# Patient Record
Sex: FEMALE | Race: BLACK OR AFRICAN AMERICAN | NOT HISPANIC OR LATINO | URBAN - METROPOLITAN AREA
[De-identification: names, ages, dates, MRNs, and addresses within clinical notes are randomized per-mention and may not be internally consistent; named-entity substitution may affect disease eponyms.]

---

## 2017-10-09 ENCOUNTER — EMERGENCY (EMERGENCY)
Facility: HOSPITAL | Age: 22
LOS: 1 days | Discharge: HOME | End: 2017-10-09
Admitting: EMERGENCY MEDICINE
Payer: COMMERCIAL

## 2017-10-09 VITALS
SYSTOLIC BLOOD PRESSURE: 105 MMHG | HEART RATE: 94 BPM | OXYGEN SATURATION: 100 % | DIASTOLIC BLOOD PRESSURE: 68 MMHG | RESPIRATION RATE: 16 BRPM | TEMPERATURE: 99 F

## 2017-10-09 LAB
APPEARANCE UR: CLEAR — SIGNIFICANT CHANGE UP
BACTERIA # UR AUTO: SIGNIFICANT CHANGE UP
BASOPHILS # BLD AUTO: 0.03 K/UL — SIGNIFICANT CHANGE UP (ref 0–0.2)
BASOPHILS NFR BLD AUTO: 0.5 % — SIGNIFICANT CHANGE UP (ref 0–2)
BILIRUB UR-MCNC: NEGATIVE — SIGNIFICANT CHANGE UP
BLOOD UR QL VISUAL: HIGH
COLOR SPEC: YELLOW — SIGNIFICANT CHANGE UP
EOSINOPHIL # BLD AUTO: 0.16 K/UL — SIGNIFICANT CHANGE UP (ref 0–0.5)
EOSINOPHIL NFR BLD AUTO: 2.4 % — SIGNIFICANT CHANGE UP (ref 0–6)
GLUCOSE UR-MCNC: NEGATIVE — SIGNIFICANT CHANGE UP
HCT VFR BLD CALC: 28.7 % — LOW (ref 34.5–45)
HGB BLD-MCNC: 9.9 G/DL — LOW (ref 11.5–15.5)
IMM GRANULOCYTES # BLD AUTO: 0.01 # — SIGNIFICANT CHANGE UP
IMM GRANULOCYTES NFR BLD AUTO: 0.2 % — SIGNIFICANT CHANGE UP (ref 0–1.5)
KETONES UR-MCNC: NEGATIVE — SIGNIFICANT CHANGE UP
LEUKOCYTE ESTERASE UR-ACNC: NEGATIVE — SIGNIFICANT CHANGE UP
LYMPHOCYTES # BLD AUTO: 2.85 K/UL — SIGNIFICANT CHANGE UP (ref 1–3.3)
LYMPHOCYTES # BLD AUTO: 43.1 % — SIGNIFICANT CHANGE UP (ref 13–44)
MCHC RBC-ENTMCNC: 31.7 PG — SIGNIFICANT CHANGE UP (ref 27–34)
MCHC RBC-ENTMCNC: 34.5 % — SIGNIFICANT CHANGE UP (ref 32–36)
MCV RBC AUTO: 92 FL — SIGNIFICANT CHANGE UP (ref 80–100)
MONOCYTES # BLD AUTO: 0.4 K/UL — SIGNIFICANT CHANGE UP (ref 0–0.9)
MONOCYTES NFR BLD AUTO: 6.1 % — SIGNIFICANT CHANGE UP (ref 2–14)
MUCOUS THREADS # UR AUTO: SIGNIFICANT CHANGE UP
NEUTROPHILS # BLD AUTO: 3.16 K/UL — SIGNIFICANT CHANGE UP (ref 1.8–7.4)
NEUTROPHILS NFR BLD AUTO: 47.7 % — SIGNIFICANT CHANGE UP (ref 43–77)
NITRITE UR-MCNC: NEGATIVE — SIGNIFICANT CHANGE UP
NRBC # FLD: 0 — SIGNIFICANT CHANGE UP
PH UR: 8.5 — HIGH (ref 4.6–8)
PLATELET # BLD AUTO: 188 K/UL — SIGNIFICANT CHANGE UP (ref 150–400)
PMV BLD: 10.5 FL — SIGNIFICANT CHANGE UP (ref 7–13)
PROT UR-MCNC: 20 — SIGNIFICANT CHANGE UP
RBC # BLD: 3.12 M/UL — LOW (ref 3.8–5.2)
RBC # FLD: 11.8 % — SIGNIFICANT CHANGE UP (ref 10.3–14.5)
RBC CASTS # UR COMP ASSIST: >50 — HIGH (ref 0–?)
SP GR SPEC: 1.01 — SIGNIFICANT CHANGE UP (ref 1–1.03)
SQUAMOUS # UR AUTO: SIGNIFICANT CHANGE UP
UROBILINOGEN FLD QL: NORMAL E.U. — SIGNIFICANT CHANGE UP (ref 0.1–0.2)
WBC # BLD: 6.61 K/UL — SIGNIFICANT CHANGE UP (ref 3.8–10.5)
WBC # FLD AUTO: 6.61 K/UL — SIGNIFICANT CHANGE UP (ref 3.8–10.5)
WBC UR QL: SIGNIFICANT CHANGE UP (ref 0–?)

## 2017-10-09 PROCEDURE — 99284 EMERGENCY DEPT VISIT MOD MDM: CPT

## 2017-10-09 RX ORDER — IBUPROFEN 200 MG
600 TABLET ORAL ONCE
Qty: 0 | Refills: 0 | Status: DISCONTINUED | OUTPATIENT
Start: 2017-10-09 | End: 2017-10-09

## 2017-10-09 RX ORDER — KETOROLAC TROMETHAMINE 30 MG/ML
30 SYRINGE (ML) INJECTION ONCE
Qty: 0 | Refills: 0 | Status: DISCONTINUED | OUTPATIENT
Start: 2017-10-09 | End: 2017-10-09

## 2017-10-09 RX ADMIN — Medication 30 MILLIGRAM(S): at 23:47

## 2017-10-09 NOTE — ED PROVIDER NOTE - PLAN OF CARE
Follow up with your OBGYN in 1 week for further evaluation. Take Ibuprofen 600mg every 6 hours as needed for pain. Return to the Emergency Department for any new, worsening or concerning symptoms.

## 2017-10-09 NOTE — ED PROVIDER NOTE - CARE PLAN
Instructions for follow-up, activity and diet:	Follow up with your OBGYN in 1 week for further evaluation. Take Ibuprofen 600mg every 6 hours as needed for pain. Return to the Emergency Department for any new, worsening or concerning symptoms. Principal Discharge DX:	Dysmenorrhea  Instructions for follow-up, activity and diet:	Follow up with your OBGYN in 1 week for further evaluation. Take Ibuprofen 600mg every 6 hours as needed for pain. Return to the Emergency Department for any new, worsening or concerning symptoms.

## 2017-10-09 NOTE — ED PROVIDER NOTE - OBJECTIVE STATEMENT
22 year old female with no pertinent PMHx pw heavy vaginal bleeding for 3 days. Pt states that she has only had spotting after her IUD removal in October 2016. Pt states she is saturating 3-4 pads per day and today it has been lighter compared to the last few days. States that while she was waiting to com into the room she began to have abdominal cramping. Denies n/v/f/c, CP, SOB, dysuria, hematuria, melena, hematochezia, hx of stds. 22 year old female with no pertinent PMHx pw heavy vaginal bleeding for 3 days. Pt states that she has only had monthly spotting after her IUD removal in October 2016. Pt states she is saturating 3-4 pads per day and today it has been lighter compared to the last few days. States that while she was waiting to com into the room she began to have abdominal cramping. Denies n/v/f/c, CP, SOB, dysuria, hematuria, melena, hematochezia, hx of stds.

## 2017-10-09 NOTE — ED PROVIDER NOTE - PROGRESS NOTE DETAILS
PARVIN Oscar: pt NAD, VSS, pt hemodynamically stable - H/H is similar to previous values. Pt feeling better with pain medications. PE: abdomen-soft/nontender/nondistended. Advised to follow up with OBGYN.

## 2017-10-09 NOTE — ED PROVIDER NOTE - NONTENDER LOCATION
right upper quadrant/right lower quadrant/left lower quadrant/periumbilical/umbilical/suprapubic/left costovertebral angle/left upper quadrant/right costovertebral angle

## 2017-10-09 NOTE — ED ADULT TRIAGE NOTE - CHIEF COMPLAINT QUOTE
pt reports "heavy menstrual cycle since friday." pt states the need to double up pads and soaking through them. reports ha, "sleepiness," abdominal and lower back pain. removed IUD October 2016. this is first period in about 6 months. pt appears comfortable. denies medical hx

## 2017-10-11 LAB
BACTERIA UR CULT: SIGNIFICANT CHANGE UP
SPECIMEN SOURCE: SIGNIFICANT CHANGE UP

## 2019-01-22 ENCOUNTER — EMERGENCY (EMERGENCY)
Facility: HOSPITAL | Age: 24
LOS: 1 days | Discharge: HOME | End: 2019-01-22
Attending: EMERGENCY MEDICINE | Admitting: EMERGENCY MEDICINE
Payer: OTHER MISCELLANEOUS

## 2019-01-22 VITALS
RESPIRATION RATE: 16 BRPM | DIASTOLIC BLOOD PRESSURE: 94 MMHG | HEART RATE: 72 BPM | SYSTOLIC BLOOD PRESSURE: 142 MMHG | OXYGEN SATURATION: 100 % | TEMPERATURE: 98 F

## 2019-01-22 PROCEDURE — 99282 EMERGENCY DEPT VISIT SF MDM: CPT

## 2019-01-22 RX ORDER — IBUPROFEN 200 MG
600 TABLET ORAL ONCE
Qty: 0 | Refills: 0 | Status: COMPLETED | OUTPATIENT
Start: 2019-01-22 | End: 2019-01-22

## 2019-01-22 NOTE — ED PROVIDER NOTE - NSFOLLOWUPINSTRUCTIONS_ED_ALL_ED_FT
1. Rest and Elevate your arm  2. Use hot or cold packs as needed  3. 600mg motrin every 6 hours for pain  4. Increase physical activity as tolerated

## 2019-01-22 NOTE — ED PROVIDER NOTE - OBJECTIVE STATEMENT
24yo F denies PMHx p/w CC RUE pain after injury at work, explains she was getting ice from the machine when the door fell off and hit her forearm/hand, states she is now having some shoulder and upper back pain, no fever chills cp sob n/v/d numbness/tingling/weakness.

## 2019-01-22 NOTE — ED PROVIDER NOTE - ATTENDING CONTRIBUTION TO CARE
Dr. Caldera: 24 yo female with no sig PMH in ED with pain to right UE s/p door on ice machine at work hitting arm.  No other injuries or complaints.  No numbness, tingling or focal weakness.  On exam pt well appearing, in NAD, heart RRR, lungs CTAB, abd NTND, extremities without swelling, strength 5/5 in all extremities and skin without rash.

## 2019-01-22 NOTE — ED PROVIDER NOTE - MEDICAL DECISION MAKING DETAILS
well appearing 22yo F p/w CC RUE pain after work injury, pain c/w musculoskeletal origin, will treat with motrin (does not take pills, requesting liquid) and DC home with instructions to [facility].

## 2019-01-22 NOTE — ED ADULT TRIAGE NOTE - CHIEF COMPLAINT QUOTE
Pt. c/o pain to right shoulder radiating into upper arm. States she had part of freezer fall on her arm at work. Denies tingling/numbness.

## 2019-01-25 ENCOUNTER — EMERGENCY (EMERGENCY)
Facility: HOSPITAL | Age: 24
LOS: 1 days | Discharge: HOME | End: 2019-01-25
Attending: EMERGENCY MEDICINE | Admitting: EMERGENCY MEDICINE
Payer: OTHER MISCELLANEOUS

## 2019-01-25 VITALS
TEMPERATURE: 98 F | SYSTOLIC BLOOD PRESSURE: 117 MMHG | HEART RATE: 70 BPM | RESPIRATION RATE: 17 BRPM | DIASTOLIC BLOOD PRESSURE: 82 MMHG | OXYGEN SATURATION: 100 %

## 2019-01-25 PROCEDURE — 71046 X-RAY EXAM CHEST 2 VIEWS: CPT | Mod: 26

## 2019-01-25 PROCEDURE — 73030 X-RAY EXAM OF SHOULDER: CPT | Mod: 26,RT

## 2019-01-25 PROCEDURE — 99284 EMERGENCY DEPT VISIT MOD MDM: CPT

## 2019-01-25 RX ORDER — KETOROLAC TROMETHAMINE 30 MG/ML
30 SYRINGE (ML) INJECTION ONCE
Qty: 0 | Refills: 0 | Status: DISCONTINUED | OUTPATIENT
Start: 2019-01-25 | End: 2019-01-25

## 2019-01-25 RX ORDER — ACETAMINOPHEN 500 MG
650 TABLET ORAL ONCE
Qty: 0 | Refills: 0 | Status: COMPLETED | OUTPATIENT
Start: 2019-01-25 | End: 2019-01-25

## 2019-01-25 RX ADMIN — Medication 30 MILLIGRAM(S): at 14:41

## 2019-01-25 NOTE — ED PROVIDER NOTE - NSFOLLOWUPINSTRUCTIONS_ED_ALL_ED_FT
Take motrin 200 mg every 6-8 hours as needed for pain and/or tylenol 325 mg every 4 hours as needed for pain.  If worse pain, swelling, weakness, numbness, or any worse symptoms return to the ER.  Follow up with your doctor within the next few days and orthopedics.

## 2019-01-25 NOTE — ED PROVIDER NOTE - PHYSICAL EXAMINATION
well appearing,   Head is AT,  rt arm: no ttp of forearm,  neck: no midline tenderness,  rt trapezius pain but no deformity

## 2019-01-25 NOTE — ED ADULT TRIAGE NOTE - CHIEF COMPLAINT QUOTE
pt seen on Tuesday and diagnosed with a pulled muscle, given Motrin and heat packs, states pain persists. +ROM noted.

## 2019-01-25 NOTE — ED PROVIDER NOTE - OBJECTIVE STATEMENT
24 YO F presents to ED with c/o rt arm pain x3 days. Pt states while at work reached into freezer to get ice, when the door to close freezer dropped on her arm, because door was broken. Pt states she came to [facility] the day event happened, and was told to take pain reliever (Motrin) and heat packs. Stopped taken Motrin because she states it only made her sleepy. Continues to use icy patch. Pt notes arm is still in pain and swollen. Did not receive an XR at that time. Pt states movement aggravates the arm. Pt works at [employer]. Pt doesn't smoke or drink ETOH. Pt has irregular periods. No further complaints. 24 YO F presents to ED with c/o rt arm pain x3 days. Pt states while at work reached into freezer to get ice, when the door to close freezer dropped on her arm, because door was broken. Pt states she came to [facility] the day event happened, and was told to take pain reliever (Motrin) and heat packs. Stopped taken Motrin because she states it only made her sleepy. Continues to use icy patch. Pt notes arm is still in pain and swollen. Did not receive an XR at that time. Pt states movement aggravates the arm. 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 Pt doesn't smoke or drink ETOH. Pt has irregular periods. No further complaints.

## 2019-12-05 ENCOUNTER — EMERGENCY (EMERGENCY)
Facility: HOSPITAL | Age: 24
LOS: 1 days | Discharge: HOME | End: 2019-12-05
Admitting: EMERGENCY MEDICINE
Payer: COMMERCIAL

## 2019-12-05 VITALS
TEMPERATURE: 98 F | SYSTOLIC BLOOD PRESSURE: 111 MMHG | RESPIRATION RATE: 18 BRPM | DIASTOLIC BLOOD PRESSURE: 71 MMHG | HEART RATE: 74 BPM | OXYGEN SATURATION: 100 %

## 2019-12-05 LAB
ALBUMIN SERPL ELPH-MCNC: 4.5 G/DL — SIGNIFICANT CHANGE UP (ref 3.3–5)
ALP SERPL-CCNC: 84 U/L — SIGNIFICANT CHANGE UP (ref 40–120)
ALT FLD-CCNC: 10 U/L — SIGNIFICANT CHANGE UP (ref 4–33)
ANION GAP SERPL CALC-SCNC: 14 MMO/L — SIGNIFICANT CHANGE UP (ref 7–14)
APPEARANCE UR: SIGNIFICANT CHANGE UP
AST SERPL-CCNC: 13 U/L — SIGNIFICANT CHANGE UP (ref 4–32)
BACTERIA # UR AUTO: SIGNIFICANT CHANGE UP
BASOPHILS # BLD AUTO: 0.02 K/UL — SIGNIFICANT CHANGE UP (ref 0–0.2)
BASOPHILS NFR BLD AUTO: 0.2 % — SIGNIFICANT CHANGE UP (ref 0–2)
BILIRUB SERPL-MCNC: 0.4 MG/DL — SIGNIFICANT CHANGE UP (ref 0.2–1.2)
BILIRUB UR-MCNC: NEGATIVE — SIGNIFICANT CHANGE UP
BLOOD UR QL VISUAL: NEGATIVE — SIGNIFICANT CHANGE UP
BUN SERPL-MCNC: 9 MG/DL — SIGNIFICANT CHANGE UP (ref 7–23)
CALCIUM SERPL-MCNC: 9.9 MG/DL — SIGNIFICANT CHANGE UP (ref 8.4–10.5)
CHLORIDE SERPL-SCNC: 97 MMOL/L — LOW (ref 98–107)
CO2 SERPL-SCNC: 25 MMOL/L — SIGNIFICANT CHANGE UP (ref 22–31)
COLOR SPEC: SIGNIFICANT CHANGE UP
CREAT SERPL-MCNC: 0.64 MG/DL — SIGNIFICANT CHANGE UP (ref 0.5–1.3)
EOSINOPHIL # BLD AUTO: 0.03 K/UL — SIGNIFICANT CHANGE UP (ref 0–0.5)
EOSINOPHIL NFR BLD AUTO: 0.3 % — SIGNIFICANT CHANGE UP (ref 0–6)
GLUCOSE SERPL-MCNC: 84 MG/DL — SIGNIFICANT CHANGE UP (ref 70–99)
GLUCOSE UR-MCNC: NEGATIVE — SIGNIFICANT CHANGE UP
HCG SERPL-ACNC: SIGNIFICANT CHANGE UP MIU/ML
HCT VFR BLD CALC: 38.9 % — SIGNIFICANT CHANGE UP (ref 34.5–45)
HGB BLD-MCNC: 13 G/DL — SIGNIFICANT CHANGE UP (ref 11.5–15.5)
IMM GRANULOCYTES NFR BLD AUTO: 0.2 % — SIGNIFICANT CHANGE UP (ref 0–1.5)
KETONES UR-MCNC: >150 — HIGH
LEUKOCYTE ESTERASE UR-ACNC: SIGNIFICANT CHANGE UP
LYMPHOCYTES # BLD AUTO: 2 K/UL — SIGNIFICANT CHANGE UP (ref 1–3.3)
LYMPHOCYTES # BLD AUTO: 23 % — SIGNIFICANT CHANGE UP (ref 13–44)
MCHC RBC-ENTMCNC: 30.2 PG — SIGNIFICANT CHANGE UP (ref 27–34)
MCHC RBC-ENTMCNC: 33.4 % — SIGNIFICANT CHANGE UP (ref 32–36)
MCV RBC AUTO: 90.3 FL — SIGNIFICANT CHANGE UP (ref 80–100)
MONOCYTES # BLD AUTO: 0.48 K/UL — SIGNIFICANT CHANGE UP (ref 0–0.9)
MONOCYTES NFR BLD AUTO: 5.5 % — SIGNIFICANT CHANGE UP (ref 2–14)
NEUTROPHILS # BLD AUTO: 6.13 K/UL — SIGNIFICANT CHANGE UP (ref 1.8–7.4)
NEUTROPHILS NFR BLD AUTO: 70.8 % — SIGNIFICANT CHANGE UP (ref 43–77)
NITRITE UR-MCNC: NEGATIVE — SIGNIFICANT CHANGE UP
NRBC # FLD: 0 K/UL — SIGNIFICANT CHANGE UP (ref 0–0)
PH UR: 7 — SIGNIFICANT CHANGE UP (ref 5–8)
PLATELET # BLD AUTO: 234 K/UL — SIGNIFICANT CHANGE UP (ref 150–400)
PMV BLD: 10.7 FL — SIGNIFICANT CHANGE UP (ref 7–13)
POTASSIUM SERPL-MCNC: 3.7 MMOL/L — SIGNIFICANT CHANGE UP (ref 3.5–5.3)
POTASSIUM SERPL-SCNC: 3.7 MMOL/L — SIGNIFICANT CHANGE UP (ref 3.5–5.3)
PROT SERPL-MCNC: 8.2 G/DL — SIGNIFICANT CHANGE UP (ref 6–8.3)
PROT UR-MCNC: 70 — SIGNIFICANT CHANGE UP
RBC # BLD: 4.31 M/UL — SIGNIFICANT CHANGE UP (ref 3.8–5.2)
RBC # FLD: 13.2 % — SIGNIFICANT CHANGE UP (ref 10.3–14.5)
RBC CASTS # UR COMP ASSIST: SIGNIFICANT CHANGE UP (ref 0–?)
SODIUM SERPL-SCNC: 136 MMOL/L — SIGNIFICANT CHANGE UP (ref 135–145)
SP GR SPEC: 1.04 — SIGNIFICANT CHANGE UP (ref 1–1.04)
SQUAMOUS # UR AUTO: SIGNIFICANT CHANGE UP
UROBILINOGEN FLD QL: SIGNIFICANT CHANGE UP
WBC # BLD: 8.68 K/UL — SIGNIFICANT CHANGE UP (ref 3.8–10.5)
WBC # FLD AUTO: 8.68 K/UL — SIGNIFICANT CHANGE UP (ref 3.8–10.5)
WBC UR QL: HIGH (ref 0–?)

## 2019-12-05 PROCEDURE — 99284 EMERGENCY DEPT VISIT MOD MDM: CPT

## 2019-12-05 PROCEDURE — 76830 TRANSVAGINAL US NON-OB: CPT | Mod: 26

## 2019-12-05 RX ORDER — NITROFURANTOIN MACROCRYSTAL 50 MG
1 CAPSULE ORAL
Qty: 14 | Refills: 0
Start: 2019-12-05 | End: 2019-12-11

## 2019-12-05 RX ORDER — ONDANSETRON 8 MG/1
1 TABLET, FILM COATED ORAL
Qty: 12 | Refills: 0
Start: 2019-12-05

## 2019-12-05 RX ORDER — ONDANSETRON 8 MG/1
4 TABLET, FILM COATED ORAL ONCE
Refills: 0 | Status: COMPLETED | OUTPATIENT
Start: 2019-12-05 | End: 2019-12-05

## 2019-12-05 RX ORDER — SODIUM CHLORIDE 9 MG/ML
1000 INJECTION INTRAMUSCULAR; INTRAVENOUS; SUBCUTANEOUS ONCE
Refills: 0 | Status: COMPLETED | OUTPATIENT
Start: 2019-12-05 | End: 2019-12-05

## 2019-12-05 RX ADMIN — ONDANSETRON 4 MILLIGRAM(S): 8 TABLET, FILM COATED ORAL at 17:42

## 2019-12-05 RX ADMIN — SODIUM CHLORIDE 1000 MILLILITER(S): 9 INJECTION INTRAMUSCULAR; INTRAVENOUS; SUBCUTANEOUS at 17:42

## 2019-12-05 NOTE — ED ADULT NURSE NOTE - CHIEF COMPLAINT QUOTE
Pt with co lower abdominal pain radiating to the left. Pt reports nausea and vomiting since sunday. Pt is afebrile. no vomiting noted in triage . Pt was sent over from [facility] was diagnosed with UTI and Positive pregnancy test. Pt denies any dysuria.

## 2019-12-05 NOTE — ED PROVIDER NOTE - PATIENT PORTAL LINK FT
You can access the FollowMyHealth Patient Portal offered by [facility] by registering at the following website: [email or web address]. By joining [employer]’s FollowMyHealth portal, you will also be able to view your health information using other applications (apps) compatible with our system.

## 2019-12-05 NOTE — ED PROVIDER NOTE - OBJECTIVE STATEMENT
25 yo female  presents to ED c/o lower abdominal pain L>R, n/v x few days.  Pain radiates to the flank.  Pt states saw PCP today and was told she tested positive for pregnancy.  LMP 10/3/19.  Pt referred to ED for hydration and US r/o ectopic.  Pt denies any vaginal bleeding or discharge, fever, chills, urinary symptoms, cp, sob, diarrhea.

## 2019-12-05 NOTE — ED PROVIDER NOTE - NSFOLLOWUPINSTRUCTIONS_ED_ALL_ED_FT
Take zofran 4 mg every 4 hours as needed for nausea.  Take macrobid 100mg 2x/day for 7 days.  Take prenatal vitamins daily.  Drink plenty of fluids.  Follow up with your ob/gyn within 1-2 weeks.  Return to ED for any worsening symptoms.

## 2019-12-05 NOTE — ED ADULT TRIAGE NOTE - CHIEF COMPLAINT QUOTE
Pt with co lower abdominal pain radiating to the left. Pt reports nausea and vomiting since sunday. Pt is afebrile. no vomiting noted in triage . Pt was sent over from [facility] was diagnosed with UTI and Positive pregnancy test. Pt denies any dysuria.

## 2019-12-05 NOTE — ED ADULT NURSE NOTE - OBJECTIVE STATEMENT
pt complaining of lower abd pain and nausea, ucg positive , denies any shortness off breathing or vag bleeding ,iv started

## 2019-12-05 NOTE — ED PROVIDER NOTE - CLINICAL SUMMARY MEDICAL DECISION MAKING FREE TEXT BOX
25 yo female  presents to ED c/o lower abdominal pain L>R, n/v x few days.  +UCG, not confirmed with US; will check labs, hcg, US, UA, IV fluids and reassess

## 2019-12-07 LAB
BACTERIA UR CULT: SIGNIFICANT CHANGE UP
SPECIMEN SOURCE: SIGNIFICANT CHANGE UP

## 2019-12-20 ENCOUNTER — EMERGENCY (EMERGENCY)
Facility: HOSPITAL | Age: 24
LOS: 1 days | Discharge: HOME | End: 2019-12-20
Attending: STUDENT IN AN ORGANIZED HEALTH CARE EDUCATION/TRAINING PROGRAM | Admitting: EMERGENCY MEDICINE
Payer: COMMERCIAL

## 2019-12-20 VITALS
HEART RATE: 71 BPM | OXYGEN SATURATION: 100 % | TEMPERATURE: 99 F | DIASTOLIC BLOOD PRESSURE: 74 MMHG | SYSTOLIC BLOOD PRESSURE: 118 MMHG | RESPIRATION RATE: 16 BRPM

## 2019-12-20 LAB
ALBUMIN SERPL ELPH-MCNC: 4.5 G/DL — SIGNIFICANT CHANGE UP (ref 3.3–5)
ALP SERPL-CCNC: 82 U/L — SIGNIFICANT CHANGE UP (ref 40–120)
ALT FLD-CCNC: 47 U/L — HIGH (ref 4–33)
ANION GAP SERPL CALC-SCNC: 16 MMO/L — HIGH (ref 7–14)
APPEARANCE UR: SIGNIFICANT CHANGE UP
AST SERPL-CCNC: 34 U/L — HIGH (ref 4–32)
BACTERIA # UR AUTO: HIGH
BASOPHILS # BLD AUTO: 0.03 K/UL — SIGNIFICANT CHANGE UP (ref 0–0.2)
BASOPHILS NFR BLD AUTO: 0.3 % — SIGNIFICANT CHANGE UP (ref 0–2)
BILIRUB SERPL-MCNC: 0.5 MG/DL — SIGNIFICANT CHANGE UP (ref 0.2–1.2)
BILIRUB UR-MCNC: NEGATIVE — SIGNIFICANT CHANGE UP
BLOOD UR QL VISUAL: NEGATIVE — SIGNIFICANT CHANGE UP
BUN SERPL-MCNC: 12 MG/DL — SIGNIFICANT CHANGE UP (ref 7–23)
CALCIUM SERPL-MCNC: 10.3 MG/DL — SIGNIFICANT CHANGE UP (ref 8.4–10.5)
CHLORIDE SERPL-SCNC: 100 MMOL/L — SIGNIFICANT CHANGE UP (ref 98–107)
CO2 SERPL-SCNC: 25 MMOL/L — SIGNIFICANT CHANGE UP (ref 22–31)
COLOR SPEC: YELLOW — SIGNIFICANT CHANGE UP
CREAT SERPL-MCNC: 0.73 MG/DL — SIGNIFICANT CHANGE UP (ref 0.5–1.3)
EOSINOPHIL # BLD AUTO: 0.04 K/UL — SIGNIFICANT CHANGE UP (ref 0–0.5)
EOSINOPHIL NFR BLD AUTO: 0.4 % — SIGNIFICANT CHANGE UP (ref 0–6)
GLUCOSE SERPL-MCNC: 83 MG/DL — SIGNIFICANT CHANGE UP (ref 70–99)
GLUCOSE UR-MCNC: NEGATIVE — SIGNIFICANT CHANGE UP
HCG SERPL-ACNC: SIGNIFICANT CHANGE UP MIU/ML
HCT VFR BLD CALC: 39.8 % — SIGNIFICANT CHANGE UP (ref 34.5–45)
HGB BLD-MCNC: 13.5 G/DL — SIGNIFICANT CHANGE UP (ref 11.5–15.5)
HYALINE CASTS # UR AUTO: HIGH
IMM GRANULOCYTES NFR BLD AUTO: 0.2 % — SIGNIFICANT CHANGE UP (ref 0–1.5)
KETONES UR-MCNC: HIGH
LEUKOCYTE ESTERASE UR-ACNC: NEGATIVE — SIGNIFICANT CHANGE UP
LYMPHOCYTES # BLD AUTO: 2.31 K/UL — SIGNIFICANT CHANGE UP (ref 1–3.3)
LYMPHOCYTES # BLD AUTO: 23.3 % — SIGNIFICANT CHANGE UP (ref 13–44)
MCHC RBC-ENTMCNC: 30.5 PG — SIGNIFICANT CHANGE UP (ref 27–34)
MCHC RBC-ENTMCNC: 33.9 % — SIGNIFICANT CHANGE UP (ref 32–36)
MCV RBC AUTO: 90 FL — SIGNIFICANT CHANGE UP (ref 80–100)
MONOCYTES # BLD AUTO: 0.72 K/UL — SIGNIFICANT CHANGE UP (ref 0–0.9)
MONOCYTES NFR BLD AUTO: 7.3 % — SIGNIFICANT CHANGE UP (ref 2–14)
NEUTROPHILS # BLD AUTO: 6.81 K/UL — SIGNIFICANT CHANGE UP (ref 1.8–7.4)
NEUTROPHILS NFR BLD AUTO: 68.5 % — SIGNIFICANT CHANGE UP (ref 43–77)
NITRITE UR-MCNC: NEGATIVE — SIGNIFICANT CHANGE UP
NRBC # FLD: 0 K/UL — SIGNIFICANT CHANGE UP (ref 0–0)
PH UR: 6 — SIGNIFICANT CHANGE UP (ref 5–8)
PLATELET # BLD AUTO: 288 K/UL — SIGNIFICANT CHANGE UP (ref 150–400)
PMV BLD: 10.2 FL — SIGNIFICANT CHANGE UP (ref 7–13)
POTASSIUM SERPL-MCNC: 3.4 MMOL/L — LOW (ref 3.5–5.3)
POTASSIUM SERPL-SCNC: 3.4 MMOL/L — LOW (ref 3.5–5.3)
PROT SERPL-MCNC: 8.6 G/DL — HIGH (ref 6–8.3)
PROT UR-MCNC: 70 — SIGNIFICANT CHANGE UP
RBC # BLD: 4.42 M/UL — SIGNIFICANT CHANGE UP (ref 3.8–5.2)
RBC # FLD: 12.7 % — SIGNIFICANT CHANGE UP (ref 10.3–14.5)
RBC CASTS # UR COMP ASSIST: SIGNIFICANT CHANGE UP (ref 0–?)
SODIUM SERPL-SCNC: 141 MMOL/L — SIGNIFICANT CHANGE UP (ref 135–145)
SP GR SPEC: 1.04 — SIGNIFICANT CHANGE UP (ref 1–1.04)
SQUAMOUS # UR AUTO: SIGNIFICANT CHANGE UP
UROBILINOGEN FLD QL: HIGH
WBC # BLD: 9.93 K/UL — SIGNIFICANT CHANGE UP (ref 3.8–10.5)
WBC # FLD AUTO: 9.93 K/UL — SIGNIFICANT CHANGE UP (ref 3.8–10.5)
WBC UR QL: HIGH (ref 0–?)

## 2019-12-20 PROCEDURE — 76817 TRANSVAGINAL US OBSTETRIC: CPT | Mod: 26

## 2019-12-20 PROCEDURE — 99220: CPT

## 2019-12-20 RX ORDER — SODIUM CHLORIDE 9 MG/ML
2000 INJECTION INTRAMUSCULAR; INTRAVENOUS; SUBCUTANEOUS ONCE
Refills: 0 | Status: COMPLETED | OUTPATIENT
Start: 2019-12-20 | End: 2019-12-20

## 2019-12-20 RX ORDER — ONDANSETRON 8 MG/1
4 TABLET, FILM COATED ORAL ONCE
Refills: 0 | Status: COMPLETED | OUTPATIENT
Start: 2019-12-20 | End: 2019-12-20

## 2019-12-20 RX ORDER — SODIUM CHLORIDE 9 MG/ML
1000 INJECTION, SOLUTION INTRAVENOUS
Refills: 0 | Status: DISCONTINUED | OUTPATIENT
Start: 2019-12-20 | End: 2019-12-21

## 2019-12-20 RX ADMIN — ONDANSETRON 4 MILLIGRAM(S): 8 TABLET, FILM COATED ORAL at 20:34

## 2019-12-20 RX ADMIN — SODIUM CHLORIDE 120 MILLILITER(S): 9 INJECTION, SOLUTION INTRAVENOUS at 20:34

## 2019-12-20 RX ADMIN — SODIUM CHLORIDE 2000 MILLILITER(S): 9 INJECTION INTRAMUSCULAR; INTRAVENOUS; SUBCUTANEOUS at 22:51

## 2019-12-20 NOTE — ED PROVIDER NOTE - PROGRESS NOTE DETAILS
Darlene Mcrae M.D: US showing IUP and small subchor. discussed with OBGYN who are aware of pt and discussed with cdu who are accepting pt for supportive care.

## 2019-12-20 NOTE — ED PROVIDER NOTE - CLINICAL SUMMARY MEDICAL DECISION MAKING FREE TEXT BOX
23 yo F, , with 8 weeks of gestational age, irregular menstrual period, with no significant PMH presents to the ER c/o worsening nausea, vomiting, unable to tolerate oral intake x1 week. well appearing female p/w n/v a/w mild pelvic pain, no vaginal bleeding, unable to tolerate oral intake, likely hyperemesis; plan: give IVF, check labs, Ua, Hcg, TVUS to eval pregnancy, likely CDU for supportive care and antiemetic.

## 2019-12-20 NOTE — ED PROVIDER NOTE - ATTENDING CONTRIBUTION TO CARE
Darlene Mcrae M.D: 24F , currently 8 weeks pregnant, confirmed IUP on US, p/w intractable n/v since pregnancy began. has been following with OB (Dr Brock) and has been taking zofran and reglan without relief. states she called her ob today who stated pt may need PICC line and visiting nurse for further care. also notes intermittently with low back pain and abd crmaping but this has been constant for weeks and is unchanged. no f/c no urinary symptoms. on exam abd soft ntnd, heart rrr, lungs ctab, mucous membranes dry. likely hyperemesis persistent despite outpt treatment attempts. plan for labs, urine, fluids, antiemetics and cdu vs admission for further care. benign abd exam at this time no cocnern for appendicitis or other surgical emergencies.

## 2019-12-20 NOTE — ED ADULT TRIAGE NOTE - CHIEF COMPLAINT QUOTE
Pt apprx 8 weeks preg. c/o vomiting x multiple episodes this past week. Unable to keep food down. Has taken Zofran and Reglan PO without relief. Also having abd discomfort. Denies any other pmhx

## 2019-12-20 NOTE — ED PROVIDER NOTE - OBJECTIVE STATEMENT
25 yo F, , with 8 weeks of gestational age, irregular menstrual period, with no significant PMH presents to the ER c/o worsening nausea, vomiting, unable to tolerate oral intake x1 week. Pt reports nausea every time she smells food, vomiting intermittently right after eating, and spitting out fluids. States she took Zofran with mild improvement of nausea, but still can't keep food & fluids down. 23 yo F, , with 8 weeks of gestational age, irregular menstrual period, with no significant PMH presents to the ER c/o worsening nausea, vomiting, unable to tolerate oral intake x1 week. Pt reports nausea every time she smells food, vomiting intermittently right after eating, and spitting out fluids. States she took Zofran with mild improvement of nausea, but still can't keep food & fluids down. Pt admits having mid pelvic pain, mild, intermittent, since 20, no vaginal bleeding. Reports having some pain, like a poke in left abdomen from vomiting. Denies any fever, chills, dizziness, headache, chest pain, sob, dysuria, hematuria, trauma/injury or any other complaints.

## 2019-12-21 VITALS
DIASTOLIC BLOOD PRESSURE: 75 MMHG | OXYGEN SATURATION: 100 % | TEMPERATURE: 98 F | SYSTOLIC BLOOD PRESSURE: 112 MMHG | RESPIRATION RATE: 16 BRPM | HEART RATE: 78 BPM

## 2019-12-21 DIAGNOSIS — O21.0 MILD HYPEREMESIS GRAVIDARUM: ICD-10-CM

## 2019-12-21 LAB
ALBUMIN SERPL ELPH-MCNC: 2.9 G/DL — LOW (ref 3.3–5)
ALP SERPL-CCNC: 56 U/L — SIGNIFICANT CHANGE UP (ref 40–120)
ALT FLD-CCNC: 34 U/L — HIGH (ref 4–33)
ANION GAP SERPL CALC-SCNC: 12 MMO/L — SIGNIFICANT CHANGE UP (ref 7–14)
ANION GAP SERPL CALC-SCNC: 9 MMO/L — SIGNIFICANT CHANGE UP (ref 7–14)
APPEARANCE UR: CLEAR — SIGNIFICANT CHANGE UP
AST SERPL-CCNC: 25 U/L — SIGNIFICANT CHANGE UP (ref 4–32)
BACTERIA # UR AUTO: SIGNIFICANT CHANGE UP
BILIRUB SERPL-MCNC: 0.4 MG/DL — SIGNIFICANT CHANGE UP (ref 0.2–1.2)
BILIRUB UR-MCNC: NEGATIVE — SIGNIFICANT CHANGE UP
BLOOD UR QL VISUAL: NEGATIVE — SIGNIFICANT CHANGE UP
BUN SERPL-MCNC: 7 MG/DL — SIGNIFICANT CHANGE UP (ref 7–23)
BUN SERPL-MCNC: 9 MG/DL — SIGNIFICANT CHANGE UP (ref 7–23)
CALCIUM SERPL-MCNC: 8.2 MG/DL — LOW (ref 8.4–10.5)
CALCIUM SERPL-MCNC: 8.6 MG/DL — SIGNIFICANT CHANGE UP (ref 8.4–10.5)
CHLORIDE SERPL-SCNC: 106 MMOL/L — SIGNIFICANT CHANGE UP (ref 98–107)
CHLORIDE SERPL-SCNC: 108 MMOL/L — HIGH (ref 98–107)
CO2 SERPL-SCNC: 21 MMOL/L — LOW (ref 22–31)
CO2 SERPL-SCNC: 22 MMOL/L — SIGNIFICANT CHANGE UP (ref 22–31)
COLOR SPEC: YELLOW — SIGNIFICANT CHANGE UP
CREAT SERPL-MCNC: 0.62 MG/DL — SIGNIFICANT CHANGE UP (ref 0.5–1.3)
CREAT SERPL-MCNC: 0.67 MG/DL — SIGNIFICANT CHANGE UP (ref 0.5–1.3)
GLUCOSE SERPL-MCNC: 84 MG/DL — SIGNIFICANT CHANGE UP (ref 70–99)
GLUCOSE SERPL-MCNC: 88 MG/DL — SIGNIFICANT CHANGE UP (ref 70–99)
GLUCOSE UR-MCNC: NEGATIVE — SIGNIFICANT CHANGE UP
HBA1C BLD-MCNC: 5.2 % — SIGNIFICANT CHANGE UP (ref 4–5.6)
HYALINE CASTS # UR AUTO: HIGH
KETONES UR-MCNC: HIGH
LEUKOCYTE ESTERASE UR-ACNC: NEGATIVE — SIGNIFICANT CHANGE UP
MAGNESIUM SERPL-MCNC: 1.5 MG/DL — LOW (ref 1.6–2.6)
NITRITE UR-MCNC: NEGATIVE — SIGNIFICANT CHANGE UP
PH UR: 6 — SIGNIFICANT CHANGE UP (ref 5–8)
PHOSPHATE SERPL-MCNC: 2.5 MG/DL — SIGNIFICANT CHANGE UP (ref 2.5–4.5)
POTASSIUM SERPL-MCNC: 3.1 MMOL/L — LOW (ref 3.5–5.3)
POTASSIUM SERPL-MCNC: 4.2 MMOL/L — SIGNIFICANT CHANGE UP (ref 3.5–5.3)
POTASSIUM SERPL-SCNC: 3.1 MMOL/L — LOW (ref 3.5–5.3)
POTASSIUM SERPL-SCNC: 4.2 MMOL/L — SIGNIFICANT CHANGE UP (ref 3.5–5.3)
PROT SERPL-MCNC: 5.7 G/DL — LOW (ref 6–8.3)
PROT UR-MCNC: 50 — SIGNIFICANT CHANGE UP
RBC CASTS # UR COMP ASSIST: SIGNIFICANT CHANGE UP (ref 0–?)
SODIUM SERPL-SCNC: 137 MMOL/L — SIGNIFICANT CHANGE UP (ref 135–145)
SODIUM SERPL-SCNC: 141 MMOL/L — SIGNIFICANT CHANGE UP (ref 135–145)
SP GR SPEC: 1.04 — SIGNIFICANT CHANGE UP (ref 1–1.04)
SQUAMOUS # UR AUTO: SIGNIFICANT CHANGE UP
UROBILINOGEN FLD QL: HIGH
WBC UR QL: HIGH (ref 0–?)

## 2019-12-21 PROCEDURE — 99217: CPT

## 2019-12-21 RX ORDER — POTASSIUM CHLORIDE 20 MEQ
40 PACKET (EA) ORAL ONCE
Refills: 0 | Status: COMPLETED | OUTPATIENT
Start: 2019-12-21 | End: 2019-12-21

## 2019-12-21 RX ORDER — SODIUM CHLORIDE 9 MG/ML
1000 INJECTION, SOLUTION INTRAVENOUS
Refills: 0 | Status: DISCONTINUED | OUTPATIENT
Start: 2019-12-21 | End: 2019-12-30

## 2019-12-21 RX ORDER — LIDOCAINE 4 G/100G
1 CREAM TOPICAL ONCE
Refills: 0 | Status: COMPLETED | OUTPATIENT
Start: 2019-12-21 | End: 2019-12-21

## 2019-12-21 RX ORDER — METOCLOPRAMIDE HCL 10 MG
10 TABLET ORAL ONCE
Refills: 0 | Status: COMPLETED | OUTPATIENT
Start: 2019-12-21 | End: 2019-12-21

## 2019-12-21 RX ORDER — ONDANSETRON 8 MG/1
4 TABLET, FILM COATED ORAL ONCE
Refills: 0 | Status: COMPLETED | OUTPATIENT
Start: 2019-12-21 | End: 2019-12-21

## 2019-12-21 RX ORDER — ACETAMINOPHEN 500 MG
650 TABLET ORAL ONCE
Refills: 0 | Status: COMPLETED | OUTPATIENT
Start: 2019-12-21 | End: 2019-12-21

## 2019-12-21 RX ORDER — POTASSIUM CHLORIDE 20 MEQ
40 PACKET (EA) ORAL ONCE
Refills: 0 | Status: DISCONTINUED | OUTPATIENT
Start: 2019-12-21 | End: 2019-12-21

## 2019-12-21 RX ORDER — ONDANSETRON 8 MG/1
4 TABLET, FILM COATED ORAL EVERY 4 HOURS
Refills: 0 | Status: DISCONTINUED | OUTPATIENT
Start: 2019-12-21 | End: 2019-12-30

## 2019-12-21 RX ORDER — ONDANSETRON 8 MG/1
1 TABLET, FILM COATED ORAL
Qty: 15 | Refills: 0
Start: 2019-12-21

## 2019-12-21 RX ADMIN — Medication 650 MILLIGRAM(S): at 17:28

## 2019-12-21 RX ADMIN — Medication 650 MILLIGRAM(S): at 01:32

## 2019-12-21 RX ADMIN — ONDANSETRON 4 MILLIGRAM(S): 8 TABLET, FILM COATED ORAL at 20:46

## 2019-12-21 RX ADMIN — ONDANSETRON 4 MILLIGRAM(S): 8 TABLET, FILM COATED ORAL at 13:14

## 2019-12-21 RX ADMIN — ONDANSETRON 4 MILLIGRAM(S): 8 TABLET, FILM COATED ORAL at 19:10

## 2019-12-21 RX ADMIN — LIDOCAINE 1 PATCH: 4 CREAM TOPICAL at 14:31

## 2019-12-21 RX ADMIN — Medication 650 MILLIGRAM(S): at 10:41

## 2019-12-21 RX ADMIN — SODIUM CHLORIDE 150 MILLILITER(S): 9 INJECTION, SOLUTION INTRAVENOUS at 00:32

## 2019-12-21 RX ADMIN — Medication 40 MILLIEQUIVALENT(S): at 09:24

## 2019-12-21 RX ADMIN — Medication 650 MILLIGRAM(S): at 09:27

## 2019-12-21 RX ADMIN — SODIUM CHLORIDE 150 MILLILITER(S): 9 INJECTION, SOLUTION INTRAVENOUS at 20:45

## 2019-12-21 RX ADMIN — SODIUM CHLORIDE 150 MILLILITER(S): 9 INJECTION, SOLUTION INTRAVENOUS at 14:07

## 2019-12-21 RX ADMIN — ONDANSETRON 4 MILLIGRAM(S): 8 TABLET, FILM COATED ORAL at 09:04

## 2019-12-21 RX ADMIN — Medication 10 MILLIGRAM(S): at 20:02

## 2019-12-21 RX ADMIN — Medication 650 MILLIGRAM(S): at 00:32

## 2019-12-21 RX ADMIN — ONDANSETRON 4 MILLIGRAM(S): 8 TABLET, FILM COATED ORAL at 05:34

## 2019-12-21 NOTE — ED CDU PROVIDER INITIAL DAY NOTE - ATTENDING CONTRIBUTION TO CARE
Darlene Mcrae M.D: 24F , currently 8 weeks pregnant, confirmed IUP on US, p/w intractable n/v since pregnancy began. has been following with OB (Dr Brock) and has been taking zofran and reglan without relief. states she called her ob today who stated pt may need PICC line and visiting nurse for further care. also notes intermittently with low back pain and abd crmaping but this has been constant for weeks and is unchanged. no f/c no urinary symptoms. on exam abd soft ntnd, heart rrr, lungs ctab, mucous membranes dry. likely hyperemesis persistent despite outpt treatment attempts. plan for labs, urine, fluids, antiemetics and cdu vs admission for further care. benign abd exam at this time no cocnern for appendicitis or other surgical emergencies. Darlene Mcrae M.D: 24F , currently 8 weeks pregnant, confirmed IUP on US, p/w intractable n/v since pregnancy began. has been following with OB (Dr Brock) and has been taking zofran and reglan without relief. states she called her ob today who stated pt may need PICC line and visiting nurse for further care. also notes intermittently with low back pain and abd crmaping but this has been constant for weeks and is unchanged. no f/c no urinary symptoms. on exam abd soft ntnd, heart rrr, lungs ctab, mucous membranes dry. likely hyperemesis persistent despite outpt treatment attempts. plan for labs, urine, fluids, antiemetics and cdu for ivf and continued care. benign abd exam at this time no cocnern for appendicitis or other surgical emergencies.

## 2019-12-21 NOTE — ED CDU PROVIDER DISPOSITION NOTE - NSFOLLOWUPINSTRUCTIONS_ED_ALL_ED_FT
Follow up with your Ob/Gyn doctor on Monday 12/23/19.  Notify your primary medical doctor of your ER visit.  If you need to find a doctor to follow up with, you may call the [facility] Patient Access Services helpline at [phone] to find names/contact #s for a practitioner (or specialist) to follow up with.  Bring your discharge papers / test results with you to your follow up appointment(s).  Rest / no strenuous activity.  Stay well hydrated.  MEDICATIONS:  See attached medication sheet(s).  ***Return to the Emergency Department if you experience any new / worsening symptoms or have any problems / concerns.***

## 2019-12-21 NOTE — ED CDU PROVIDER SUBSEQUENT DAY NOTE - MEDICAL DECISION MAKING DETAILS
23 yo F, , with 8 weeks of gestational age, irregular menstrual period, with no significant PMH presents to the ER c/o worsening nausea, vomiting, unable to tolerate oral intake x1 week. Transferred to CDU for; IVF, am labs, antiemetics prn, vitals q4 and frequent re-evals.

## 2019-12-21 NOTE — CONSULT NOTE ADULT - SUBJECTIVE AND OBJECTIVE BOX
25 yo , LMP 10/4 with irregular cycles at 8+5 days gestation by TVUS admitted to CDU with worsening n/v, PO intolerance x1 week. Patient last admitted  for hyperemesis with IV hydration and improved nausea on Zofran. She was prescribed Zofran to take outpatient with some improvement, however her nausea/vomiting never completely resolved. She was admitted to CDU again for continued monitoring and IV hydration  and since reports improvement in nausea with Zofran, Reglan but reports return of symptoms as medications wear off. She had cereal in the morning and apple juice and a tuna sandwich in the afternoon. She was able to keep most of it down, but continues to have persistent nausea. She was last seen in Dr. Brock' office 12/10 and says she was prescribed Reglan with no relief. Plans in progress for home IV nurse, however not set up yet. Patient also reports some lower back pain and lower abdominal pain from persistent vomiting. Denies fevers, chills, dysuria, hematuria. Denies vaginal bleeding, LOF, cramping, CP, SOB, lightheadedness, dizziness.     Patient with +ketones in urine and hypokalemic on presentation s/p K repletion with correction.     OBHx - NSVDx 2 (, )   GYNhx - denies  PAST MEDICAL & SURGICAL HISTORY:  No pertinent past medical history  No significant past surgical history    HOME MEDICATIONS  Reglan     MEDICATIONS  (STANDING):  dextrose 5% + sodium chloride 0.45%. 1000 milliLiter(s) (150 mL/Hr) IV Continuous <Continuous>    MEDICATIONS  (PRN):  ondansetron Injectable 4 milliGRAM(s) IV Push every 4 hours PRN Nausea and/or Vomiting    Allergies: No Known Allergies    Physical Exam:   General: sitting comfortably in bed, NAD   Neck: supple, full ROM, no lymphadenopathy  CV: RRR  Lungs: CTA b/l, good air flow b/l   Back: No CVA tenderness bilaterally   Abd: Soft, NTND, no rebound or guarding   Pelvic: deferred   Ext: NT b/l, no edema      Vital Signs Last 24 Hrs  T(C): 36.7 (21 Dec 2019 19:36), Max: 36.9 (21 Dec 2019 09:56)  T(F): 98 (21 Dec 2019 19:36), Max: 98.4 (21 Dec 2019 09:56)  HR: 88 (21 Dec 2019 19:36) (62 - 88)  BP: 130/90 (21 Dec 2019 19:36) (91/54 - 130/90)  BP(mean): --  RR: 16 (21 Dec 2019 19:36) (15 - 17)  SpO2: 100% (21 Dec 2019 19:36) (96% - 100%)                          13.5   9.93  )-----------( 288      ( 20 Dec 2019 20:30 )             39.8           137  |  106  |  7   ----------------------------<  84  4.2   |  22  |  0.67    Ca    8.6      21 Dec 2019 13:05  Phos  2.5       Mg     1.5         TPro  5.7<L>  /  Alb  2.9<L>  /  TBili  0.4  /  DBili  x   /  AST  25  /  ALT  34<H>  /  AlkPhos  56        CAPILLARY BLOOD GLUCOSE      POCT Blood Glucose.: 84 mg/dL (21 Dec 2019 00:16)  POCT Blood Glucose.: 62 mg/dL (20 Dec 2019 23:43)      LIVER FUNCTIONS - ( 21 Dec 2019 05:25 )  Alb: 2.9 g/dL / Pro: 5.7 g/dL / ALK PHOS: 56 u/L / ALT: 34 u/L / AST: 25 u/L / GGT: x             Urinalysis Basic - ( 21 Dec 2019 01:00 )    Color: YELLOW / Appearance: CLEAR / S.036 / pH: 6.0  Gluc: NEGATIVE / Ketone: LARGE  / Bili: NEGATIVE / Urobili: MODERATE   Blood: NEGATIVE / Protein: 50 / Nitrite: NEGATIVE   Leuk Esterase: NEGATIVE / RBC: 3-5 / WBC 6-10   Sq Epi: FEW / Non Sq Epi: x / Bacteria: FEW        EXAM:  US OB TRANSVAGINAL        PROCEDURE DATE:  Dec 20 2019         INTERPRETATION:  CLINICAL INFORMATION: 8 weeks pregnant. Left pelvic pain for 2 days. Nausea and vomiting. Irregular masses.    LMP: 10/4/2019    Estimated Gestational Age by LMP: 11 weeks    COMPARISON: Pelvic ultrasound 2019    Endovaginal pelvic sonogram only. Color and Spectral Doppler was performed.    FINDINGS:    Uterus: Intrauterine pregnancy. Subchorionic hematoma measures 2.3 x 0.9 x 1.6 cm.    Gestational Sac Size (mean): 3.5 cm    Gestations sac shape : Normal.    Crown Rump Length: 2 cm     Estimated Gestational Age: 8 weeks and 4 days    Yolk Sac: Normal.    Fetal Heart Rate: 163 bpm    Right ovary: 4.5 x 1.5 x 1.5 cm. corpus luteum on 0.9 x 1.2 x 1.5 cm Normal arterial and venous waveforms.    Left ovary: 3.5 x 1.9 x 2.5 cm. Within normal limits. Normal arterial and venous waveforms.    Fluid: None.    IMPRESSION:    Single viable intrauterine pregnancy.  Estimated gestational age of 8 weeks and 4 days.  Estimated due date of  2020    Small subchorionic hematoma.

## 2019-12-21 NOTE — CONSULT NOTE ADULT - PROBLEM SELECTOR RECOMMENDATION 9
- Continue IV hydration, recommend banana bag    - Continue Zofran, Reglan, Phenergan PRN inpatient   - Recommend Rx for Zofran 8mg q6hrs x15 tabs and Phenergan suppository BID x6   - IV nurse plans initiated on Friday 12/20 and in progress per Dr. Brock. Patient to call Dr. Brock' office on Monday 12/23 to confirm finalized plans.     Flynn Jenkins PGY2  d/w Dr. Brock - Continue IV hydration, recommend banana bag    - Continue Zofran, Reglan, Phenergan PRN inpatient   - Recommend Rx for Zofran 8mg q6hrs x15 tabs and Phenergan suppository BID x6   - PO challenge in AM   - IV nurse plans initiated on Friday 12/20 and in progress per Dr. Brock. Patient to call Dr. Brock' office on Monday 12/23 to confirm finalized plans.     Flynn Jenkins PGY2  d/w Dr. Brock

## 2019-12-21 NOTE — ED CDU PROVIDER SUBSEQUENT DAY NOTE - HISTORY
Refer to Initial CDU note- "25 yo F, , with 8 weeks of gestational age, irregular menstrual period, with no significant PMH presents to the ER c/o worsening nausea, vomiting, unable to tolerate oral intake x1 week. Pt reports nausea every time she smells food, vomiting intermittently right after eating, and spitting out fluids. States she took Zofran with mild improvement of nausea, but still can't keep food & fluids down. Pt admits having mid pelvic pain, mild, intermittent, since 20, no vaginal bleeding. Reports having some pain, like a poke in left abdomen from vomiting ". Denied any fever, chills, dizziness, headache, chest pain, sob, dysuria, hematuria vaginal bleeding, trauma/injury or any other complaints. Pt seen and worked up in ED, labs wnl besides some mild hypokalemia and ketones in urine. Transferred to CDU for; IVF, am labs, antiemetics prn, vitals q4 and frequent re-evals.     In interim, patient still notes some nausea and back pain. States able to tolerate minimal PO. Will continue with antiemetic prn, pain control, IVF and monitor closely and plan to PO challenge. No acute events overnight.

## 2019-12-21 NOTE — ED CDU PROVIDER DISPOSITION NOTE - CLINICAL COURSE
24F 8 weeks preg p/w N/V and was rx diclegis and zofran. Has dx of hyperemesis gravidarum, has seen own OB Dr Brock for it and told if sx do not improve there's a program for home infusion.   Was able to yokasta PO last night and this AM.  Adv diet, if yokasta PO can go home,  small subchor seen needs f/u.  No VB.  Near 6 o'clock or so still having symptoms, doesn't feel ready to go home, OB eval consideration of admission/VNS initiation/hydration/?PICC line.  If feeling better can d/c home f/u OB as outpt.

## 2019-12-21 NOTE — CONSULT NOTE ADULT - ASSESSMENT
23 yo , LMP 10/4 at 8+5 days by TVUS admitted to CDU  with persistent nausea/vomiting, PO intolerance x1 week. Patient with improved PO intolerance since yesterday on Reglan, Zofran. TVUS with single viable IUP at 8+5 days. Hypokalemia to 3.1 s/p repletion. Repeat BMP wnl.

## 2019-12-21 NOTE — ED CDU PROVIDER SUBSEQUENT DAY NOTE - PROGRESS NOTE DETAILS
GYN consulted, regarding persistent n/v. Spoke to resident on call, states will come evaluate patient.

## 2019-12-21 NOTE — ED CDU PROVIDER SUBSEQUENT DAY NOTE - PHYSICAL EXAMINATION
Vital signs reviewed.   CONSTITUTIONAL: Well-appearing; well-nourished; in no apparent distress. Non-toxic appearing.   HEAD: Normocephalic, atraumatic.  EYES: PERRL, EOM intact, conjunctiva and sclera WNL.  ENT: normal nose; no rhinorrhea;  NECK: Trachea midline   RESP: NAD  ABD/GI: soft, non-distended; non-tender  EXT/MS: moves all extremities  SKIN: Normal for age and race  NEURO: Awake, alert, oriented x 3, no gross deficits  PSYCH: Normal mood; appropriate affect.

## 2019-12-21 NOTE — ED CDU PROVIDER INITIAL DAY NOTE - INDICATION FOR OBSERVATION
IV hydration, nausea control, supportive care, general observation care / monitoring./Therapeutic Intensity/Other

## 2019-12-21 NOTE — ED CDU PROVIDER DISPOSITION NOTE - PATIENT PORTAL LINK FT
You can access the FollowMyHealth Patient Portal offered by [facility] by registering at the following website: [email or web address]. By joining [employer]’s FollowMyHealth portal, you will also be able to view your health information using other applications (apps) compatible with our system.

## 2019-12-21 NOTE — ED CDU PROVIDER SUBSEQUENT DAY NOTE - ATTENDING CONTRIBUTION TO CARE
24F 8 weeks preg p/w N/V and was rx diclegis and zofran. Has dx of hyperemesis gravidarum, has seen own OB Dr Brock for it and told if sx do not improve there's a program for home infusion.   Was able to yokasta PO last night and this AM.  Adv diet, if yokasta PO can go home,  small subchor seen needs f/u.  No VB.  Near 6 o'clock or so still having symptoms, doesn't feel ready to go home, OB eval consideration of admission/VNS initiation/hydration/?PICC line.  If feeling better can d/c home f/u OB as outpt.  VS:  unremarkable    GEN - malaise, spitting; A+O x3   HEAD - NC/AT     ENT - PEERL, EOMI, mucous membranes dry, no discharge      NECK: Neck supple, non-tender without lymphadenopathy, no masses, no JVD  PULM - CTA b/l,  symmetric breath sounds  COR -  normal heart sounds    ABD - , gravid uterus, NT, soft,  BACK - no CVA tenderness, nontender spine     EXTREMS - no edema, no deformity, warm and well perfused    SKIN - no rash or bruising      NEUROLOGIC - alert, CN 2-12 intact, sensation nl, motor no focal deficit.

## 2019-12-21 NOTE — ED CDU PROVIDER INITIAL DAY NOTE - PROGRESS NOTE DETAILS
24F 8 weeks preg p/w N/V and was rx diclegis and zofran.  Was able to yokasta PO last night and this AM.  Adv diet, if yokasta PO can go home,  small subchor seen needs f/u.  No VB.  Pt still feeling unwell, nauseous, spitting, back cramps, headache.  Pt able to eat here incl tuna fish; pt reports her OB said to her they might consider PICC line and VNS; will call OB for eval.  Dispo per OB.  If OB clears pt and she wants to go home later I have no objection.

## 2019-12-22 LAB
BACTERIA UR CULT: SIGNIFICANT CHANGE UP
BACTERIA UR CULT: SIGNIFICANT CHANGE UP
SPECIMEN SOURCE: SIGNIFICANT CHANGE UP
SPECIMEN SOURCE: SIGNIFICANT CHANGE UP

## 2020-01-07 NOTE — ED CDU PROVIDER INITIAL DAY NOTE - OBJECTIVE STATEMENT
25 yo F, , with 8 weeks of gestational age, irregular menstrual period, with no significant PMH presents to the ER c/o worsening nausea, vomiting, unable to tolerate oral intake x1 week. Pt reports nausea every time she smells food, vomiting intermittently right after eating, and spitting out fluids. States she took Zofran with mild improvement of nausea, but still can't keep food & fluids down. Pt admits having mid pelvic pain, mild, intermittent, since 19, no vaginal bleeding. Reports having some pain, like a poke in left abdomen from vomiting. Denies any fever, chills, dizziness, headache, chest pain, sob, dysuria, hematuria, trauma/injury or any other complaints.    CDU PARVIN Sanders Note------  25 yo female, , pregnant at ~8 weeks gestational age, presented to the ED for nausea, vomiting, anorexia x 1 week, as per above.  Pt was evaluated in the ED, TVUS showed SLIUP at 8w 4d and small subchorionic hematoma.  Pt given IV hydration, ondansetron; pt dispo'd to CDU for continued care plan:  IV hydration, nausea control, supportive care, general observation care / monitoring.  On CDU arrival, pt was c/o chills; pt was orally afebrile, found to have fingerstick glucose in 60's; given juice and jello, which was tolerated well with no vomiting and improvement in fingerstick glucose to 82.  No other c/o.  CDU care plan was d/w pt who verbalizes agreement with plan. Normal rate, regular rhythm.  Heart sounds S1, S2.  No murmurs, rubs or gallops.

## 2020-05-26 ENCOUNTER — OUTPATIENT (OUTPATIENT)
Dept: INPATIENT UNIT | Facility: HOSPITAL | Age: 25
LOS: 1 days | Discharge: HOME | End: 2020-05-26
Payer: COMMERCIAL

## 2020-05-26 VITALS — HEART RATE: 82 BPM | SYSTOLIC BLOOD PRESSURE: 105 MMHG | DIASTOLIC BLOOD PRESSURE: 70 MMHG

## 2020-05-26 VITALS — TEMPERATURE: 98 F

## 2020-05-26 DIAGNOSIS — O26.899 OTHER SPECIFIED PREGNANCY RELATED CONDITIONS, UNSPECIFIED TRIMESTER: ICD-10-CM

## 2020-05-26 DIAGNOSIS — Z3A.00 WEEKS OF GESTATION OF PREGNANCY NOT SPECIFIED: ICD-10-CM

## 2020-05-26 LAB
APPEARANCE UR: CLEAR — SIGNIFICANT CHANGE UP
BILIRUB UR-MCNC: NEGATIVE — SIGNIFICANT CHANGE UP
BLOOD UR QL VISUAL: NEGATIVE — SIGNIFICANT CHANGE UP
COLOR SPEC: SIGNIFICANT CHANGE UP
FLECAINIDE SERPL-MCNC: NEGATIVE — SIGNIFICANT CHANGE UP
GLUCOSE UR-MCNC: NEGATIVE — SIGNIFICANT CHANGE UP
KETONES UR-MCNC: SIGNIFICANT CHANGE UP
LEUKOCYTE ESTERASE UR-ACNC: NEGATIVE — SIGNIFICANT CHANGE UP
NITRITE UR-MCNC: NEGATIVE — SIGNIFICANT CHANGE UP
PH UR: 7 — SIGNIFICANT CHANGE UP (ref 5–8)
PROT UR-MCNC: 10 — SIGNIFICANT CHANGE UP
SP GR SPEC: 1.01 — SIGNIFICANT CHANGE UP (ref 1–1.04)
UROBILINOGEN FLD QL: NORMAL — SIGNIFICANT CHANGE UP

## 2020-05-26 PROCEDURE — 76818 FETAL BIOPHYS PROFILE W/NST: CPT | Mod: 26

## 2020-05-26 PROCEDURE — 99204 OFFICE O/P NEW MOD 45 MIN: CPT | Mod: 25

## 2020-05-26 PROCEDURE — 76830 TRANSVAGINAL US NON-OB: CPT | Mod: 26

## 2020-05-26 RX ADMIN — Medication 12 MILLIGRAM(S): at 20:28

## 2020-05-26 NOTE — OB PROVIDER TRIAGE NOTE - NSOBPROVIDERNOTE_OBGYN_ALL_OB_FT
24 y.o.  Black patient ROB 2020 @ 30.6 weeks presents with c/o lower abdominal cramping 7/10 pain, pt unsure of interval. Pt denies LOF, VB. States +FM and uncomplicated antepartum course.    allergies:  NKDA  medications:  prenatal vitamins  ferrous sulfate    med/surg hx:  denies  OBGYN hx:  2014    6lbs F  2016  7lbs    abdomen soft, nontender  sse: cervix appears closed, moderate leukorrhea  taus: sliup, cephalic presentation, anterior placenta, bpp 8/8, carolina 15.6, efw 1520 grams  tvus: cervical length 3.31, no dynamic changes/ funneling noted  nst in progress    UA pending  PO hydration initiated    Juany NP 24 y.o.  Black patient ROB 2020 @ 30.6 weeks presents with c/o lower abdominal cramping 7/10 pain, pt unsure of interval. Pt denies LOF, VB. States +FM and uncomplicated antepartum course.    allergies:  NKDA  medications:  prenatal vitamins  ferrous sulfate    med/surg hx:  denies  OBGYN hx:  2014    6lbs F  2016  7lbs    abdomen soft, nontender  sse: cervix appears closed, moderate leukorrhea  taus: sliup, cephalic presentation, anterior placenta, bpp 8/8, carolina 15.6, efw 1520 grams  tvus: cervical length 3.31, no dynamic changes/ funneling noted  nst in progress    UA pending  PO hydration initiated      Discussed with Dr De Jesus. Plan for betamethasone dose # 1. FFN sent.    Juany, NP 24 y.o.  Black patient ROB 2020 @ 30.6 weeks presents with c/o lower abdominal cramping 7/10 pain, pt unsure of interval. Pt denies LOF, VB. States +FM and uncomplicated antepartum course.    allergies:  NKDA  medications:  prenatal vitamins  ferrous sulfate    med/surg hx:  denies  OBGYN hx:  2014    6lbs F  2016  7lbs    abdomen soft, nontender  sse: cervix appears closed, moderate leukorrhea  taus: sliup, cephalic presentation, anterior placenta, bpp 8/8, carolina 15.6, efw 1520 grams  tvus: cervical length 3.31, no dynamic changes/ funneling noted  nst in progress    UA pending  PO hydration initiated      Discussed with Dr De Jesus. Plan for betamethasone dose # 1. FFN sent.    ISAURA Harrington      FFN Negative  UA WNL   1st dose of betamethasone given    Discussed with Dr. De Jesus.   -Patient cleared for discharge home  -Pt to return tomorrow 2020 for second dose of betamethasone  -PTL precautions reviewed  -Kick counts reviewed  -Verbal and written discharge instructions, patient verbalized understanding

## 2020-05-26 NOTE — OB RN TRIAGE NOTE - PMH
No pertinent past medical history    Vaginal delivery  14 FT  (F) 6#  Vaginal delivery  2016 FT  (M) 7#

## 2020-05-26 NOTE — OB PROVIDER TRIAGE NOTE - ADDITIONAL INSTRUCTIONS
-Patient cleared for discharge home  -Pt to return tomorrow 5/27/2020 for second dose of betamethasone  -PTL precautions reviewed  -Kick counts reviewed  -Verbal and written discharge instructions, patient verbalized understanding

## 2020-05-26 NOTE — OB PROVIDER TRIAGE NOTE - NSHPPHYSICALEXAM_GEN_ALL_CORE
abdomen soft, nontender  sse: cervix appears closed, moderate leukorrhea  taus: sliup, cephalic presentation, anterior placenta, bpp 8/8, carolina 15.6, efw 1520 grams  tvus: cervical length 3.31, no dynamic changes funneling  nst in progress

## 2020-05-26 NOTE — OB PROVIDER TRIAGE NOTE - HISTORY OF PRESENT ILLNESS
24 y.o.  Black patient ROB 7/29/2020 @ 30.6 weeks presents with c/o lower abdominal cramping 7/10 pain, pt unsure of interval. Pt denies LOF, VB. States +FM and uncomplicated antepartum course.

## 2020-05-27 ENCOUNTER — OUTPATIENT (OUTPATIENT)
Dept: INPATIENT UNIT | Facility: HOSPITAL | Age: 25
LOS: 1 days | Discharge: HOME | End: 2020-05-27

## 2020-05-27 DIAGNOSIS — O26.899 OTHER SPECIFIED PREGNANCY RELATED CONDITIONS, UNSPECIFIED TRIMESTER: ICD-10-CM

## 2020-05-27 DIAGNOSIS — Z3A.00 WEEKS OF GESTATION OF PREGNANCY NOT SPECIFIED: ICD-10-CM

## 2020-05-27 RX ADMIN — Medication 12 MILLIGRAM(S): at 20:38

## 2020-06-22 ENCOUNTER — OUTPATIENT (OUTPATIENT)
Dept: OUTPATIENT SERVICES | Facility: HOSPITAL | Age: 25
LOS: 1 days | Discharge: HOME | End: 2020-06-22
Payer: COMMERCIAL

## 2020-06-22 VITALS
DIASTOLIC BLOOD PRESSURE: 50 MMHG | HEART RATE: 87 BPM | RESPIRATION RATE: 16 BRPM | SYSTOLIC BLOOD PRESSURE: 87 MMHG | TEMPERATURE: 99 F

## 2020-06-22 DIAGNOSIS — Z3A.00 WEEKS OF GESTATION OF PREGNANCY NOT SPECIFIED: ICD-10-CM

## 2020-06-22 DIAGNOSIS — O26.899 OTHER SPECIFIED PREGNANCY RELATED CONDITIONS, UNSPECIFIED TRIMESTER: ICD-10-CM

## 2020-06-22 LAB
APPEARANCE UR: CLEAR — SIGNIFICANT CHANGE UP
BILIRUB UR-MCNC: NEGATIVE — SIGNIFICANT CHANGE UP
BLOOD UR QL VISUAL: NEGATIVE — SIGNIFICANT CHANGE UP
COLOR SPEC: COLORLESS — SIGNIFICANT CHANGE UP
GLUCOSE UR-MCNC: NEGATIVE — SIGNIFICANT CHANGE UP
KETONES UR-MCNC: NEGATIVE — SIGNIFICANT CHANGE UP
LEUKOCYTE ESTERASE UR-ACNC: NEGATIVE — SIGNIFICANT CHANGE UP
NITRITE UR-MCNC: NEGATIVE — SIGNIFICANT CHANGE UP
PH UR: 7 — SIGNIFICANT CHANGE UP (ref 5–8)
PROT UR-MCNC: NEGATIVE — SIGNIFICANT CHANGE UP
SP GR SPEC: 1.01 — SIGNIFICANT CHANGE UP (ref 1–1.04)
UROBILINOGEN FLD QL: NORMAL — SIGNIFICANT CHANGE UP

## 2020-06-22 PROCEDURE — 99214 OFFICE O/P EST MOD 30 MIN: CPT

## 2020-06-22 PROCEDURE — 76818 FETAL BIOPHYS PROFILE W/NST: CPT | Mod: 26

## 2020-06-22 RX ORDER — BENZOYL PEROXIDE MICRONIZED 5.8 %
1 TOWELETTE (EA) TOPICAL
Qty: 0 | Refills: 0 | DISCHARGE

## 2020-06-22 NOTE — OB PROVIDER TRIAGE NOTE - NSOBPROVIDERNOTE_OBGYN_ALL_OB_FT
No evidence of labor at this time     d/w Dr. Dubon     -Cleared for discharge  -Keep scheduled appt   -Fetal kick count reviewed   -Warning signs reviewed

## 2020-06-22 NOTE — OB PROVIDER TRIAGE NOTE - NSHPPHYSICALEXAM_GEN_ALL_CORE
Vital Signs Last 24 Hrs  T(C): 37 (22 Jun 2020 22:40), Max: 37 (22 Jun 2020 22:40)  T(F): 98.6 (22 Jun 2020 22:40), Max: 98.6 (22 Jun 2020 22:40)  HR: 79 (22 Jun 2020 23:04) (79 - 87)  BP: 92/57 (22 Jun 2020 23:04) (87/50 - 92/57)  RR: 16 (22 Jun 2020 22:40) (16 - 16)    Abdomen soft, nontender     SVE closed/50/-3    Category 1 tracing. , moderate variability, + accels, no decels   irregular contractions by toco    TAUS cephalic presentation, anterior placenta, carolina 18.79 cm, bpp 8/8 Vital Signs Last 24 Hrs  T(C): 37 (22 Jun 2020 22:40), Max: 37 (22 Jun 2020 22:40)  T(F): 98.6 (22 Jun 2020 22:40), Max: 98.6 (22 Jun 2020 22:40)  HR: 79 (22 Jun 2020 23:04) (79 - 87)  BP: 92/57 (22 Jun 2020 23:04) (87/50 - 92/57)  RR: 16 (22 Jun 2020 22:40) (16 - 16)    Abdomen soft, nontender     SVE closed/50/-3 (external os 1cm)    Category 1 tracing. , moderate variability, + accels, no decels   irregular contractions by toco    TAUS cephalic presentation, anterior placenta, carolina 18.79 cm, bpp 8/8    PO hydration given     Pt reexamined 2 hours later SVE closed/50/-3 (external os 1.5 cm) unchanged    pt reports pelvic/rectal pain unchanged

## 2020-06-22 NOTE — OB PROVIDER TRIAGE NOTE - NSHPLABSRESULTS_GEN_ALL_CORE
Urinalysis Basic - ( 2020 23:00 )    Color: COLORLESS / Appearance: CLEAR / S.008 / pH: 7.0  Gluc: NEGATIVE / Ketone: NEGATIVE  / Bili: NEGATIVE / Urobili: NORMAL   Blood: NEGATIVE / Protein: NEGATIVE / Nitrite: NEGATIVE   Leuk Esterase: NEGATIVE / RBC: x / WBC x   Sq Epi: x / Non Sq Epi: x / Bacteria: x

## 2020-06-22 NOTE — OB PROVIDER TRIAGE NOTE - HISTORY OF PRESENT ILLNESS
@ 34.5 wks. presents with pelvic pain  and pressure. Denies VB/LOF/CTx. Reports positive fetal movement.   PNI: Anemia, on PO Iron tablets.     Obhx: , FT ,           , FT   Gynhx: denies  Surghx: denies  Medhx: Denies 23 yo  female,  @ 34.5 wks presents with pelvic/ rectal pain and pressure since 8 pm. Pt reports pain comes and goes. Reports 7/10 pain. Denies VB/LOF/CTx. Reports positive fetal movement. Pt was seen on  for  contractions, s/p betamethasone  & .     PNI: Anemia, on PO Iron tablets.     Allergies: NKDA  Medications: PNV, iron   Obhx: , FT , uncomplicated 6#           , FT , uncomplicated 7#  Gynhx: denies  Surghx: denies  Medhx: Denies  Social: Denies   Psychosocial: Denies

## 2020-06-23 VITALS — DIASTOLIC BLOOD PRESSURE: 64 MMHG | HEART RATE: 71 BPM | SYSTOLIC BLOOD PRESSURE: 110 MMHG

## 2020-07-27 ENCOUNTER — OUTPATIENT (OUTPATIENT)
Dept: OUTPATIENT SERVICES | Facility: HOSPITAL | Age: 25
LOS: 1 days | Discharge: HOME | End: 2020-07-27
Payer: COMMERCIAL

## 2020-07-27 VITALS
SYSTOLIC BLOOD PRESSURE: 119 MMHG | DIASTOLIC BLOOD PRESSURE: 77 MMHG | HEIGHT: 62 IN | WEIGHT: 169.98 LBS | HEART RATE: 102 BPM

## 2020-07-27 VITALS
DIASTOLIC BLOOD PRESSURE: 77 MMHG | HEART RATE: 102 BPM | RESPIRATION RATE: 18 BRPM | SYSTOLIC BLOOD PRESSURE: 119 MMHG | TEMPERATURE: 98 F

## 2020-07-27 DIAGNOSIS — Z3A.00 WEEKS OF GESTATION OF PREGNANCY NOT SPECIFIED: ICD-10-CM

## 2020-07-27 DIAGNOSIS — O26.899 OTHER SPECIFIED PREGNANCY RELATED CONDITIONS, UNSPECIFIED TRIMESTER: ICD-10-CM

## 2020-07-27 PROCEDURE — 76818 FETAL BIOPHYS PROFILE W/NST: CPT | Mod: 26

## 2020-07-27 PROCEDURE — 99214 OFFICE O/P EST MOD 30 MIN: CPT

## 2020-07-27 RX ORDER — BENZOYL PEROXIDE MICRONIZED 5.8 %
1 TOWELETTE (EA) TOPICAL
Qty: 0 | Refills: 0 | DISCHARGE

## 2020-07-28 NOTE — OB PROVIDER TRIAGE NOTE - HISTORY OF PRESENT ILLNESS
Patient of Dr Brock  25 y/o  female, para 2002 @ 39+5 wks gestation, single IUP.  Presents to triage with c/o ctx q 5-10 min since 9 pm  Pt endorses strong fetal movement, denies any leakage of fluid or vaginal bleeding. GBS Negative    A/P Complications:   ctx, s/p Beta @ 35 wks. Anemia  Blood Transfusion: Patient will accept blood    Covid Assessment: Pt denies any: fever, chills, cough, SOB or any recent known exposure to Covid-19.    Allergies: NKDA  Meds: PNV, Fe  PMH: Denies  PSH: Denies  OBgynHx    2014-Uncomplicated  @ 38 wks. Female, 6#  2016-Uncomplicated  @ 38 wks. Male, 7#  H/o Abn. PAP/+HPV, repeat wnl  Pt denies any h/o: ovarian cysts, uterine fibroids, breast problems, STDs/STIs  PSY: Denies  EtOH/ Smoke/ Recreational substance use: denies  FH: Hypertension (Mother)  H/W/BMI: 62"/170#/31.1

## 2020-07-28 NOTE — OB PROVIDER TRIAGE NOTE - NSOBPROVIDERNOTE_OBGYN_ALL_OB_FT
Patient re-examined 3 hrs since initial exam, now 2.5/50/-3. Fetus very active. Patient re-examined 3 hrs since initial exam, now 2.5/50/-3.   Dr Bowles notified of above findings  Tracing reviewed by Dr Bowles.  Patient may be d/c'ed home upon continuous/Reactive NST  Patient to follow-up in the office with Dr Brock

## 2020-07-28 NOTE — OB PROVIDER TRIAGE NOTE - NSHPLABSRESULTS_GEN_ALL_CORE
Vital Signs Last 24 Hrs  T(C): 36.9 (27 Jul 2020 23:42), Max: 36.9 (27 Jul 2020 23:42)  T(F): 98.4 (27 Jul 2020 23:42), Max: 98.4 (27 Jul 2020 23:42)  HR: 102 (27 Jul 2020 23:49) (102 - 102)  BP: 119/77 (27 Jul 2020 23:49) (119/77 - 119/77)  BP(mean): --  RR: 18 (27 Jul 2020 23:42) (18 - 18)  SpO2: --

## 2020-07-28 NOTE — OB PROVIDER TRIAGE NOTE - NS_OBGYNHISTORY_OBGYN_ALL_OB_FT
2014-Uncomplicated  @ 38 wks. Female, 6#  2016-Uncomplicated  @ 38 wks. Male, 7#  H/o Abn. PAP/+HPV, repeat wnl  Pt denies any h/o: ovarian cysts, uterine fibroids, breast problems, STDs/STIs

## 2020-07-28 NOTE — OB PROVIDER TRIAGE NOTE - PMH
Anemia    HPV in female    Low grade squamous intraepithelial lesion on cytologic smear of cervix (LGSIL)    Vaginal delivery  2016 FT  (M) 7#  Vaginal delivery  14 FT  (F) 6#

## 2020-07-28 NOTE — OB PROVIDER TRIAGE NOTE - ADDITIONAL INSTRUCTIONS
Follow up with Dr Brock for routine OB care  Continue fetal kick counts/fetal awareness  Return to triage for: decreased fetal movement, leakage of fluid, vaginal bleeding, increase in contraction frequency/intensity, or for any other concerns.

## 2020-07-28 NOTE — OB PROVIDER TRIAGE NOTE - PLAN OF CARE
Patient cleared to be discharge home by Dr Bowles  Follow up with Dr Brock for routine OB care  Continue fetal kick counts/fetal awareness  Return to triage for: decreased fetal movement, leakage of fluid, vaginal bleeding, increase in contraction frequency/intensity, or for any other concerns.   Strict labor precautions reviewed with patient. Patient verbalized understanding

## 2020-07-28 NOTE — OB PROVIDER TRIAGE NOTE - NSHPPHYSICALEXAM_GEN_ALL_CORE
23 y/o  female, para  @ 39+5 wks gestation, single IUP.  No evidence of labor at this time  Gen: NAD; A+O x 2  Cardiac: S1/S2, R/R/R  Pulm: CTAB  Abdomen: Gravid, soft, non-tender  VS-BP: 119/77, P 102, RR 18, T 36.9, Pain 8/10-Pt declines any pain meds at this time   Cat 1 tracin bpm, moderate variability, +accels, no decels  [place]: Sporadic  SVE: 2/60/-3, intact membranes  GBS Negative  Clinical EFW: 3000g  Limited TAS: 23 y/o  female, para  @ 39+5 wks gestation, single IUP.  No evidence of labor at this time  Gen: NAD; A+O x 2  Cardiac: S1/S2, R/R/R  Pulm: CTAB  Abdomen: Gravid, soft, non-tender  VS-BP: 119/77, P 102, RR 18, T 36.9, Pain 8/10-Pt declines any pain meds at this time   Cat 1 tracin bpm, moderate variability, +accels, no decels  [place]: Sporadic  SVE: 2/60/-3, intact membranes  GBS Negative  Clinical EFW: 3000g  Limited TAS: SLIUP, Anterior placenta, BPP 8/8, WADE 7.48 cm

## 2020-08-01 ENCOUNTER — TRANSCRIPTION ENCOUNTER (OUTPATIENT)
Age: 25
End: 2020-08-01

## 2020-08-01 ENCOUNTER — INPATIENT (INPATIENT)
Facility: HOSPITAL | Age: 25
LOS: 0 days | Discharge: HOME | End: 2020-08-02
Attending: OBSTETRICS & GYNECOLOGY | Admitting: OBSTETRICS & GYNECOLOGY

## 2020-08-01 VITALS
HEART RATE: 75 BPM | TEMPERATURE: 98 F | SYSTOLIC BLOOD PRESSURE: 137 MMHG | RESPIRATION RATE: 16 BRPM | DIASTOLIC BLOOD PRESSURE: 85 MMHG

## 2020-08-01 DIAGNOSIS — O26.899 OTHER SPECIFIED PREGNANCY RELATED CONDITIONS, UNSPECIFIED TRIMESTER: ICD-10-CM

## 2020-08-01 DIAGNOSIS — Z3A.00 WEEKS OF GESTATION OF PREGNANCY NOT SPECIFIED: ICD-10-CM

## 2020-08-01 PROBLEM — B97.7 PAPILLOMAVIRUS AS THE CAUSE OF DISEASES CLASSIFIED ELSEWHERE: Chronic | Status: ACTIVE | Noted: 2020-07-28

## 2020-08-01 PROBLEM — D64.9 ANEMIA, UNSPECIFIED: Chronic | Status: ACTIVE | Noted: 2020-07-28

## 2020-08-01 PROBLEM — R87.612 LOW GRADE SQUAMOUS INTRAEPITHELIAL LESION ON CYTOLOGIC SMEAR OF CERVIX (LGSIL): Chronic | Status: ACTIVE | Noted: 2020-07-28

## 2020-08-01 LAB
BASOPHILS # BLD AUTO: 0.02 K/UL — SIGNIFICANT CHANGE UP (ref 0–0.2)
BASOPHILS NFR BLD AUTO: 0.2 % — SIGNIFICANT CHANGE UP (ref 0–2)
BLD GP AB SCN SERPL QL: NEGATIVE — SIGNIFICANT CHANGE UP
EOSINOPHIL # BLD AUTO: 0.04 K/UL — SIGNIFICANT CHANGE UP (ref 0–0.5)
EOSINOPHIL NFR BLD AUTO: 0.4 % — SIGNIFICANT CHANGE UP (ref 0–6)
HCT VFR BLD CALC: 32.7 % — LOW (ref 34.5–45)
HGB BLD-MCNC: 10.8 G/DL — LOW (ref 11.5–15.5)
IMM GRANULOCYTES NFR BLD AUTO: 0.5 % — SIGNIFICANT CHANGE UP (ref 0–1.5)
LYMPHOCYTES # BLD AUTO: 2.65 K/UL — SIGNIFICANT CHANGE UP (ref 1–3.3)
LYMPHOCYTES # BLD AUTO: 24.2 % — SIGNIFICANT CHANGE UP (ref 13–44)
MCHC RBC-ENTMCNC: 28.3 PG — SIGNIFICANT CHANGE UP (ref 27–34)
MCHC RBC-ENTMCNC: 33 % — SIGNIFICANT CHANGE UP (ref 32–36)
MCV RBC AUTO: 85.6 FL — SIGNIFICANT CHANGE UP (ref 80–100)
MONOCYTES # BLD AUTO: 1.1 K/UL — HIGH (ref 0–0.9)
MONOCYTES NFR BLD AUTO: 10 % — SIGNIFICANT CHANGE UP (ref 2–14)
NEUTROPHILS # BLD AUTO: 7.1 K/UL — SIGNIFICANT CHANGE UP (ref 1.8–7.4)
NEUTROPHILS NFR BLD AUTO: 64.7 % — SIGNIFICANT CHANGE UP (ref 43–77)
NRBC # FLD: 0 K/UL — SIGNIFICANT CHANGE UP (ref 0–0)
PLATELET # BLD AUTO: 198 K/UL — SIGNIFICANT CHANGE UP (ref 150–400)
PMV BLD: 10.3 FL — SIGNIFICANT CHANGE UP (ref 7–13)
RBC # BLD: 3.82 M/UL — SIGNIFICANT CHANGE UP (ref 3.8–5.2)
RBC # FLD: 15.5 % — HIGH (ref 10.3–14.5)
RH IG SCN BLD-IMP: POSITIVE — SIGNIFICANT CHANGE UP
WBC # BLD: 10.96 K/UL — HIGH (ref 3.8–10.5)
WBC # FLD AUTO: 10.96 K/UL — HIGH (ref 3.8–10.5)

## 2020-08-01 RX ORDER — DIBUCAINE 1 %
1 OINTMENT (GRAM) RECTAL EVERY 6 HOURS
Refills: 0 | Status: DISCONTINUED | OUTPATIENT
Start: 2020-08-01 | End: 2020-08-02

## 2020-08-01 RX ORDER — OXYTOCIN 10 UNIT/ML
333.33 VIAL (ML) INJECTION
Qty: 20 | Refills: 0 | Status: DISCONTINUED | OUTPATIENT
Start: 2020-08-01 | End: 2020-08-02

## 2020-08-01 RX ORDER — OXYCODONE HYDROCHLORIDE 5 MG/1
5 TABLET ORAL ONCE
Refills: 0 | Status: DISCONTINUED | OUTPATIENT
Start: 2020-08-01 | End: 2020-08-02

## 2020-08-01 RX ORDER — OXYCODONE HYDROCHLORIDE 5 MG/1
5 TABLET ORAL
Refills: 0 | Status: DISCONTINUED | OUTPATIENT
Start: 2020-08-01 | End: 2020-08-02

## 2020-08-01 RX ORDER — BENZOCAINE 10 %
1 GEL (GRAM) MUCOUS MEMBRANE EVERY 6 HOURS
Refills: 0 | Status: DISCONTINUED | OUTPATIENT
Start: 2020-08-01 | End: 2020-08-02

## 2020-08-01 RX ORDER — ACETAMINOPHEN 500 MG
975 TABLET ORAL
Refills: 0 | Status: DISCONTINUED | OUTPATIENT
Start: 2020-08-01 | End: 2020-08-02

## 2020-08-01 RX ORDER — KETOROLAC TROMETHAMINE 30 MG/ML
30 SYRINGE (ML) INJECTION ONCE
Refills: 0 | Status: DISCONTINUED | OUTPATIENT
Start: 2020-08-01 | End: 2020-08-02

## 2020-08-01 RX ORDER — TETANUS TOXOID, REDUCED DIPHTHERIA TOXOID AND ACELLULAR PERTUSSIS VACCINE, ADSORBED 5; 2.5; 8; 8; 2.5 [IU]/.5ML; [IU]/.5ML; UG/.5ML; UG/.5ML; UG/.5ML
0.5 SUSPENSION INTRAMUSCULAR ONCE
Refills: 0 | Status: COMPLETED | OUTPATIENT
Start: 2020-08-01

## 2020-08-01 RX ORDER — OXYTOCIN 10 UNIT/ML
VIAL (ML) INJECTION
Qty: 20 | Refills: 0 | Status: DISCONTINUED | OUTPATIENT
Start: 2020-08-01 | End: 2020-08-01

## 2020-08-01 RX ORDER — SODIUM CHLORIDE 9 MG/ML
3 INJECTION INTRAMUSCULAR; INTRAVENOUS; SUBCUTANEOUS EVERY 8 HOURS
Refills: 0 | Status: DISCONTINUED | OUTPATIENT
Start: 2020-08-01 | End: 2020-08-02

## 2020-08-01 RX ORDER — IBUPROFEN 200 MG
600 TABLET ORAL EVERY 6 HOURS
Refills: 0 | Status: DISCONTINUED | OUTPATIENT
Start: 2020-08-01 | End: 2020-08-02

## 2020-08-01 RX ORDER — PRAMOXINE HYDROCHLORIDE 150 MG/15G
1 AEROSOL, FOAM RECTAL EVERY 4 HOURS
Refills: 0 | Status: DISCONTINUED | OUTPATIENT
Start: 2020-08-01 | End: 2020-08-02

## 2020-08-01 RX ORDER — SIMETHICONE 80 MG/1
80 TABLET, CHEWABLE ORAL EVERY 4 HOURS
Refills: 0 | Status: DISCONTINUED | OUTPATIENT
Start: 2020-08-01 | End: 2020-08-02

## 2020-08-01 RX ORDER — DIPHENHYDRAMINE HCL 50 MG
25 CAPSULE ORAL EVERY 6 HOURS
Refills: 0 | Status: DISCONTINUED | OUTPATIENT
Start: 2020-08-01 | End: 2020-08-02

## 2020-08-01 RX ORDER — AER TRAVELER 0.5 G/1
1 SOLUTION RECTAL; TOPICAL EVERY 4 HOURS
Refills: 0 | Status: DISCONTINUED | OUTPATIENT
Start: 2020-08-01 | End: 2020-08-02

## 2020-08-01 RX ORDER — MAGNESIUM HYDROXIDE 400 MG/1
30 TABLET, CHEWABLE ORAL
Refills: 0 | Status: DISCONTINUED | OUTPATIENT
Start: 2020-08-01 | End: 2020-08-02

## 2020-08-01 RX ORDER — SODIUM CHLORIDE 9 MG/ML
1000 INJECTION, SOLUTION INTRAVENOUS
Refills: 0 | Status: DISCONTINUED | OUTPATIENT
Start: 2020-08-01 | End: 2020-08-01

## 2020-08-01 RX ORDER — LANOLIN
1 OINTMENT (GRAM) TOPICAL EVERY 6 HOURS
Refills: 0 | Status: DISCONTINUED | OUTPATIENT
Start: 2020-08-01 | End: 2020-08-02

## 2020-08-01 RX ORDER — HYDROCORTISONE 1 %
1 OINTMENT (GRAM) TOPICAL EVERY 6 HOURS
Refills: 0 | Status: DISCONTINUED | OUTPATIENT
Start: 2020-08-01 | End: 2020-08-02

## 2020-08-01 RX ADMIN — Medication 1000 MILLIUNIT(S)/MIN: at 22:32

## 2020-08-01 NOTE — OB NEONATOLOGY/PEDIATRICIAN DELIVERY SUMMARY - NSPEDSNEONOTESA_OBGYN_ALL_OB_FT
Baby Wilfredo is a 40+3 week GA F born to a 24 y/o  mother via  . Maternal history: anemia. Pregnancy uncomplicated. Maternal blood type A+. Prenatal labs negative, nonreactive and immune respectively . per mom, GBS negative. ROM _ 18 hours with clear* fluid. Baby born vigorous and crying spontaneously. Warmed, dried, stimulated. Apgars 8/9. BW 3020. EOS score 0.02 . Mom plans to breastfeed and bottle-feed and consents hepB. Plans on using same PMD as other sibling.

## 2020-08-01 NOTE — OB NEONATOLOGY/PEDIATRICIAN DELIVERY SUMMARY - BABY A: APGAR 5 MIN SCORE, DELIVERY
Please return to the Emergency Department for any new or worsening symptoms including: worsening pain, drainage from the wound, fever, chest pain, shortness of breath, loss of consciousness, dizziness, weakness, or any other concerns.     Please follow up with your child's pediatrician or physician that did the surgery tomorrow.    Please take all medication as prescribed.      9

## 2020-08-01 NOTE — OB PROVIDER DELIVERY SUMMARY - NSPROVIDERDELIVERYNOTE_OBGYN_ALL_OB_FT
Spontaneous vaginal delivery of liveborn infant from RON position. Head, shoulders, and body delivered easily. Infant was suctioned. No mec. Delayed cord clamping performed.  Cord was clamped and cut and infant was passes to moth. Placenta delivered with a 3 vessel cord. Fundal message was given and uterine fundus was found to be firm. Vaginal exam reveled an intact cervix, vaginal walls and sulci. Patient stable. Count was correct x 2.

## 2020-08-01 NOTE — DISCHARGE NOTE OB - MEDICATION SUMMARY - MEDICATIONS TO TAKE
I will START or STAY ON the medications listed below when I get home from the hospital:    ibuprofen 100 mg/5 mL oral suspension  -- 30 milliliter(s) by mouth every 6 hours  -- Indication: For pain

## 2020-08-01 NOTE — DISCHARGE NOTE OB - CARE PROVIDER_API CALL
Debbie Brock  OBSTETRICS AND GYNECOLOGY  [address] [place] [address]  Phone: [phone]  Fax: [phone]  Follow Up Time:

## 2020-08-01 NOTE — OB PROVIDER TRIAGE NOTE - NSCHILDCOND1_OBGYN_ALL_OB
Actions Requested: Rx  Situation/Background   Problem: burning after urination   Onset: 2 days   Associated Symptoms: Pt stts finished antibiotics for sinus infection on Monday and yesterday started with burning after urination.  Denies urgency,frequency,di Back      - You become worse Living

## 2020-08-01 NOTE — DISCHARGE NOTE OB - PATIENT PORTAL LINK FT
You can access the FollowMyHealth Patient Portal offered by [facility] by registering at the following website: [email or web address]. By joining [employer]’s FollowMyHealth portal, you will also be able to view your health information using other applications (apps) compatible with our system.

## 2020-08-01 NOTE — OB PROVIDER TRIAGE NOTE - NSOBPROVIDERNOTE_OBGYN_ALL_OB_FT
@ 40.3 wks admit, active labor  Expectant management  GBS negative  Continuos monitoring  Discussed with Dr. Dubon

## 2020-08-01 NOTE — OB RN DELIVERY SUMMARY - NS_SEPSISRSKCALC_OBGYN_ALL_OB_FT
EOS calculated successfully. EOS Risk Factor: 0.5/1000 live births ([facility] national incidence); GA=40w3d; Temp=98.4; ROM=0.083; GBS='Unknown'; Antibiotics='No antibiotics or any antibiotics < 2 hrs prior to birth'

## 2020-08-01 NOTE — OB PROVIDER TRIAGE NOTE - NSHPPHYSICALEXAM_GEN_ALL_CORE
VS: 130/70  TOCO: ctx irregular  NST: 's  VE: 8/100/-3, BB, urge to push  Sono: vertex, presentation only

## 2020-08-01 NOTE — OB PROVIDER TRIAGE NOTE - HISTORY OF PRESENT ILLNESS
@ 40 wks, presents with ctx, denies VB/LOF. Reports positive fetal movement. GBS negative EFW 7lbs  PNI: Anemia, on Iron tablets  Obhx: Vaginal delivery  2016 FT  (M) 7#  Vaginal delivery  14 FT  (F) 6#    Gynhx: denies  Surghx: denies  Medhx: denies

## 2020-08-02 VITALS
SYSTOLIC BLOOD PRESSURE: 109 MMHG | TEMPERATURE: 98 F | RESPIRATION RATE: 18 BRPM | OXYGEN SATURATION: 100 % | HEART RATE: 84 BPM | DIASTOLIC BLOOD PRESSURE: 75 MMHG

## 2020-08-02 LAB — SARS-COV-2 RNA SPEC QL NAA+PROBE: SIGNIFICANT CHANGE UP

## 2020-08-02 RX ORDER — IBUPROFEN 200 MG
600 TABLET ORAL EVERY 6 HOURS
Refills: 0 | Status: DISCONTINUED | OUTPATIENT
Start: 2020-08-02 | End: 2020-08-02

## 2020-08-02 RX ORDER — FERROUS SULFATE 325(65) MG
1 TABLET ORAL
Qty: 0 | Refills: 0 | DISCHARGE

## 2020-08-02 RX ORDER — ACETAMINOPHEN 500 MG
975 TABLET ORAL EVERY 6 HOURS
Refills: 0 | Status: DISCONTINUED | OUTPATIENT
Start: 2020-08-02 | End: 2020-08-02

## 2020-08-02 RX ORDER — IBUPROFEN 200 MG
30 TABLET ORAL
Qty: 0 | Refills: 0 | DISCHARGE
Start: 2020-08-02

## 2020-08-02 RX ORDER — TETANUS TOXOID, REDUCED DIPHTHERIA TOXOID AND ACELLULAR PERTUSSIS VACCINE, ADSORBED 5; 2.5; 8; 8; 2.5 [IU]/.5ML; [IU]/.5ML; UG/.5ML; UG/.5ML; UG/.5ML
0.5 SUSPENSION INTRAMUSCULAR ONCE
Refills: 0 | Status: COMPLETED | OUTPATIENT
Start: 2020-08-02 | End: 2020-08-02

## 2020-08-02 RX ADMIN — Medication 600 MILLIGRAM(S): at 14:29

## 2020-08-02 RX ADMIN — Medication 600 MILLIGRAM(S): at 04:45

## 2020-08-02 RX ADMIN — Medication 600 MILLIGRAM(S): at 15:15

## 2020-08-02 RX ADMIN — SODIUM CHLORIDE 3 MILLILITER(S): 9 INJECTION INTRAMUSCULAR; INTRAVENOUS; SUBCUTANEOUS at 05:39

## 2020-08-02 RX ADMIN — TETANUS TOXOID, REDUCED DIPHTHERIA TOXOID AND ACELLULAR PERTUSSIS VACCINE, ADSORBED 0.5 MILLILITER(S): 5; 2.5; 8; 8; 2.5 SUSPENSION INTRAMUSCULAR at 21:10

## 2020-08-02 RX ADMIN — Medication 600 MILLIGRAM(S): at 03:58

## 2020-08-02 RX ADMIN — SODIUM CHLORIDE 3 MILLILITER(S): 9 INJECTION INTRAMUSCULAR; INTRAVENOUS; SUBCUTANEOUS at 14:28

## 2020-08-02 NOTE — PROGRESS NOTE ADULT - SUBJECTIVE AND OBJECTIVE BOX
S: Patient doing well. Minimal lochia. Breastfeeding  O: Vital Signs Last 24 Hrs  T(C): 36.7 (02 Aug 2020 05:41), Max: 36.9 (01 Aug 2020 20:51)  T(F): 98 (02 Aug 2020 05:41), Max: 98.4 (01 Aug 2020 20:51)  HR: 74 (02 Aug 2020 05:41) (68 - 94)  BP: 105/61 (02 Aug 2020 05:41) (105/61 - 185/123)  BP(mean): --  RR: 18 (02 Aug 2020 05:41) (16 - 18)  SpO2: 100% (02 Aug 2020 05:41) (91% - 100%)    Gen: NAD  Abd: soft, NT, ND, fundus firm below umbilicus  Lochia: moderate  Ext: no tenderness    Labs:                        10.8   10.96 )-----------( 198      ( 01 Aug 2020 21:10 )             32.7       A: 24y PPD#1 s/p  doing well.     Plan: Continue routine postpartum care.            Discharge planning

## 2020-08-03 LAB — T PALLIDUM AB TITR SER: NEGATIVE — SIGNIFICANT CHANGE UP

## 2021-08-30 NOTE — OB PROVIDER TRIAGE NOTE - NSANTENATALSTERA_OBGYN_ALL_OB
· Admission sodium 132  · This is unlikely the cause of her symptoms  · Will give normal saline   · Monitor BMP in a m  No

## 2023-06-01 NOTE — OB NEONATOLOGY/PEDIATRICIAN DELIVERY SUMMARY - BABY A: APGAR 5 MIN COLOR, DELIVERY
(1) body pink, extremities blue No ptx on POCUS post-procedure. CXR pending./no pneumothorax/post-procedure radiography performed

## 2025-02-14 NOTE — ED PROVIDER NOTE - CROS ED NEURO ALL NEG
Goal Outcome Evaluation:      Patient is resting in bed with some complaint of right leg pain with bruising. Patient has been falling at home related to weakness. She is voiding via pure wick system and urine  is cloudy yellow. VSS, she continues on a CIWA scale with highest scoring so far at 6, for c/o headache. Patient also admitted with a skin tear to her left hand, it has soft scabbing in place so cleansed, vaseline gauze placed, and secured with kerlix wrap.                                       negative...